# Patient Record
Sex: MALE | Race: WHITE | NOT HISPANIC OR LATINO | URBAN - METROPOLITAN AREA
[De-identification: names, ages, dates, MRNs, and addresses within clinical notes are randomized per-mention and may not be internally consistent; named-entity substitution may affect disease eponyms.]

---

## 2020-03-21 ENCOUNTER — EMERGENCY (EMERGENCY)
Facility: HOSPITAL | Age: 55
LOS: 0 days | Discharge: HOME | End: 2020-03-21
Attending: EMERGENCY MEDICINE
Payer: COMMERCIAL

## 2020-03-21 VITALS
HEIGHT: 69 IN | SYSTOLIC BLOOD PRESSURE: 127 MMHG | TEMPERATURE: 99 F | RESPIRATION RATE: 16 BRPM | HEART RATE: 58 BPM | OXYGEN SATURATION: 100 % | WEIGHT: 162.04 LBS | DIASTOLIC BLOOD PRESSURE: 84 MMHG

## 2020-03-21 DIAGNOSIS — B34.9 VIRAL INFECTION, UNSPECIFIED: ICD-10-CM

## 2020-03-21 DIAGNOSIS — R09.81 NASAL CONGESTION: ICD-10-CM

## 2020-03-21 DIAGNOSIS — Z79.51 LONG TERM (CURRENT) USE OF INHALED STEROIDS: ICD-10-CM

## 2020-03-21 DIAGNOSIS — R05 COUGH: ICD-10-CM

## 2020-03-21 DIAGNOSIS — R50.9 FEVER, UNSPECIFIED: ICD-10-CM

## 2020-03-21 PROCEDURE — 99283 EMERGENCY DEPT VISIT LOW MDM: CPT

## 2020-03-21 PROCEDURE — 99283 EMERGENCY DEPT VISIT LOW MDM: CPT | Mod: 25

## 2020-03-21 PROCEDURE — 71045 X-RAY EXAM CHEST 1 VIEW: CPT

## 2020-03-21 PROCEDURE — 71045 X-RAY EXAM CHEST 1 VIEW: CPT | Mod: 26

## 2020-03-21 RX ORDER — ALBUTEROL 90 UG/1
2 AEROSOL, METERED ORAL
Qty: 1 | Refills: 0
Start: 2020-03-21

## 2020-03-21 NOTE — ED ADULT NURSE NOTE - CAS EDN DISCHARGE ASSESSMENT
no acute respiratory distress, ambulating with steady gait/Alert and oriented to person, place and time

## 2020-03-21 NOTE — ED PROVIDER NOTE - PATIENT PORTAL LINK FT
You can access the FollowMyHealth Patient Portal offered by [facility] by registering at the following website: [email or web address]. By joining [facility]’s FollowMyHealth portal, you will also be able to view your health information using other applications (apps) compatible with our system.

## 2020-03-21 NOTE — ED PROVIDER NOTE - TEMPLATE
Communicable/Infectious ID following Dr Carrasco  Continue on IV Dapto 500mg daily. will switch to Zyvoxx on Monday til 3/22  Silvadene to RLE wound  Wound care consult

## 2020-03-21 NOTE — ED ADULT NURSE NOTE - NSFALLRSKASSESSTYPE_ED_ALL_ED
Review of Systems Health Update:     GENERAL / CONSTITUTIONAL:  [x]  YES    []  NO   Excessive fatigue.  []  YES    [x]  NO   Unexplained weight loss or gain.  []  YES    [x]  NO   Excessive sweating or night sweats.    EYES:  [x]  YES    []  NO   Blurry or double vision.  []  YES    [x]  NO   Eye pain.   []  YES    [x]  NO   Other visual disturbance.    EARS, NOSE, MOUTH AND THROAT:  [x]  YES    [x]  NO   Loss of hearing or prolonged roaring/ringing in ears.   []  YES    [x]  NO   Nasal obstruction or discharge.  []  YES    [x]  NO   Hoarseness or voice changes.  []  YES    [x]  NO   Difficult or painful swallowing.    CARDIOVASCULAR:  [x]  YES    []  NO   Chest pain/discomfort at rest or with exercise.  []  YES    [x]  NO   Heart murmur, palpitations, or irregular heart beat.  [x]  YES    []  NO   History of heart attack or other heart trouble.  [x]  YES    []  NO   Leg cramps with walking.  [x]  YES    []  NO   Ankle swelling.   [x]  YES    []  NO   Shortness of breath.  [x]  YES    []  NO   History of high blood pressure.    LUNGS:   []  YES    [x]  NO   Coughing up blood  []  YES    [x]  NO   Chronic cough.  []  YES    [x]  NO   Abnormal chest x-ray.  []  YES    [x]  NO   Wheezing.  []  YES    [x]  NO   History of positive TB skin test.     IMMUNE SYSTEM/ALLERGIES:  []  YES    [x]  NO   Frequent infections.   [x]  YES    []  NO   History of asthma/allergies.  []  YES    [x]  NO   Frequent nasal congestion.   [x]  YES    []  NO   Itchy or watery eyes.   []  YES    [x]  NO   History of blood transfusion.     INTESTINAL:  [x]  YES    []  NO   Poor appetite.  [x]  YES    []  NO   Frequent indigestion or heartburn.  [x]  YES    []  NO   Nausea, vomiting, diarrhea, or constipation.  []  YES    [x]  NO   Vomiting blood.  []  YES    [x]  NO   Rectal bleeding or black tarry stools.  []  YES    [x]  NO   Diagnosis of hepatitis.    ENDOCRINE:  []  YES    [x]  NO   Heat or cold intolerance.  [x]  YES    []  NO   Excessive  thirst or urination.  [x]  YES    []  NO   History of diabetes or thyroid disease.     HEMATOLOGIC:   []  YES    [x]  NO   Swollen glands or lymph nodes.  []  YES    [x]  NO   History of anemia.   []  YES    [x]  NO   Bruise easily.   []  YES    [x]  NO   Bleeding tendencies.  []  YES    [x]  NO   History of blood clots.    MUSCULOSKELETAL:  [x]  YES    []  NO   Swollen, stiff, or painful joints.   [x]  YES    []  NO   Neck pain.   [x]  YES    []  NO   Back pain.   []  YES    [x]  NO   History of gout.     SKIN:  []  YES    [x]  NO   Recurrent skin rash.   []  YES    [x]  NO   Mole changes in size or color.  []  YES    [x]  NO   Abnormal hair growth or loss.    NEUROLOGIC:   [x]  YES    []  NO   Frequent or severe headaches.  []  YES    [x]  NO   Loss of balance.  []  YES    [x]  NO   Unexplained dizziness.  []  YES    [x]  NO   Loss of consciousness.   []  YES    [x]  NO   History of head injury.  []  YES    [x]  NO   Weakness or recurrent numbness or tingling in hands or feet.  [x]  YES    []  NO   Twitching or tremors.   [x]  YES    []  NO   Difficulty with speech.     UROLOGIC:   []  YES    [x]  NO   Recurrent bladder or kidney infections.   []  YES    [x]  NO   Kidney stones.   []  YES    [x]  NO   Chronic bladder pain, incontinence, difficulty urinating, or urinary frequency.       Initial (On Arrival)

## 2020-03-21 NOTE — ED ADULT NURSE NOTE - CHIEF COMPLAINT QUOTE
pt BIBA for fever and cough since Tuesday, Tmax 102. pt was COVID tested at [facility] on Wednesday, pending results. denies chest pain, no SOB noted in triage

## 2020-03-21 NOTE — ED ADULT NURSE NOTE - OBJECTIVE STATEMENT
c/o difficulty breathing for past 7 days, was seen in urgent care on 3 days ago and swab for covid 19 HX: denies

## 2020-03-21 NOTE — ED ADULT TRIAGE NOTE - CHIEF COMPLAINT QUOTE
pt BIBA for fever and cough since Tuesday, Tmax 102. pt was COVID tested at [facility] on Wednesday, pending results. denies chest pain, no SOB noted in triage

## 2020-03-21 NOTE — ED PROVIDER NOTE - CLINICAL SUMMARY MEDICAL DECISION MAKING FREE TEXT BOX
pt w/ viral syndrome, 100% on room air. plan CXR. Pt had COVID testing, results pending. discussed w/ pt about decongestion for the nose, oral medications, nasal sprays. Pt says he does not like to take medications in the nose

## 2020-03-21 NOTE — ED PROVIDER NOTE - OBJECTIVE STATEMENT
54 YOM w/ no pertinent PMHx presents to the ED BIBA c/o worsening fever and cough onset 4 days ago. +SOB, +sore throat, +congestion. Tmax of 102 F. Denies CP. Pt was tested for COVID at [facility] on 3/18/2020, pending results. No recent travel, no sick contacts. Pt is a non-smoker. 54 YOM w/ no pertinent PMHx presents to the ED BIBA c/o worsening fever and cough onset 4 days ago. +SOB, +sore throat, +congestion. Tmax of 102 F. Denies CP. Pt was tested for COVID at [facility] on 3/18/2020, pending results. Return from [facility] 3 weeks ago, no sick contacts. Pt is a non-smoker.

## 2020-03-21 NOTE — ED PROVIDER NOTE - NSFOLLOWUPINSTRUCTIONS_ED_ALL_ED_FT
Please call and follow up with your doctor in 1-3 days.    Viral Respiratory Infection  A viral respiratory infection is an illness that affects parts of the body used for breathing, like the lungs, nose, and throat. It is caused by a germ called a virus.    Some examples of this kind of infection are:    A cold.  The flu (influenza).  A respiratory syncytial virus (RSV) infection.    How do I know if I have this infection?  Most of the time this infection causes:    A stuffy or runny nose.  Yellow or green fluid in the nose.  A cough.  Sneezing.  Tiredness (fatigue).  Achy muscles.  A sore throat.  Sweating or chills.  A fever.  A headache.    How is this infection treated?  If the flu is diagnosed early, it may be treated with an antiviral medicine. This medicine shortens the length of time a person has symptoms. Symptoms may be treated with over-the-counter and prescription medicines, such as:    Expectorants. These make it easier to cough up mucus.  Decongestant nasal sprays.    Doctors do not prescribe antibiotic medicines for viral infections. They do not work with this kind of infection.    How do I know if I should stay home?  To keep others from getting sick, stay home if you have:    A fever.  A lasting cough.  A sore throat.  A runny nose.  Sneezing.  Muscles aches.  Headaches.  Tiredness.  Weakness.  Chills.  Sweating.  An upset stomach (nausea).    Follow these instructions at home:  Rest as much as possible.  Take over-the-counter and prescription medicines only as told by your doctor.  Drink enough fluid to keep your pee (urine) clear or pale yellow.  Gargle with salt water. Do this 3–4 times per day or as needed. To make a salt–water mixture, dissolve ½–1 tsp of salt in 1 cup of warm water. Make sure the salt dissolves all the way.  Use nose drops made from salt water. This helps with stuffiness (congestion). It also helps soften the skin around your nose.  Do not drink alcohol.  Do not use tobacco products, including cigarettes, chewing tobacco, and e-cigarettes. If you need help quitting, ask your doctor.  Get help if:  Your symptoms last for 10 days or longer.  Your symptoms get worse over time.  You have a fever.  You have very bad pain in your face or forehead.  Parts of your jaw or neck become very swollen.  Get help right away if:  You feel pain or pressure in your chest.  You have shortness of breath.  You faint or feel like you will faint.  You keep throwing up (vomiting).  You feel confused.  This information is not intended to replace advice given to you by your health care provider. Make sure you discuss any questions you have with your health care provider.    Postnasal Drip  Postnasal drip is the feeling of mucus going down the back of your throat. Mucus is a slimy substance that moistens and cleans your nose and throat, as well as the air pockets in face bones near your forehead and cheeks (sinuses). Small amounts of mucus pass from your nose and sinuses down the back of your throat all the time. This is normal. When you produce too much mucus or the mucus gets too thick, you can feel it.  Some common causes of postnasal drip include:  Having more mucus because of:  A cold or the flu.Allergies.Cold air.Certain medicines.Having more mucus that is thicker because of:  A sinus or nasal infection.Dry air.A food allergy.Follow these instructions at home:  Relieving discomfort        Gargle with a salt-water mixture 3–4 times a day or as needed. To make a salt-water mixture, completely dissolve ½–1 tsp of salt in 1 cup of warm water.If the air in your home is dry, use a humidifier to add moisture to the air.Use a saline spray or container (neti pot) to flush out the nose (nasal irrigation). These methods can help clear away mucus and keep the nasal passages moist.General instructions     Take over-the-counter and prescription medicines only as told by your health care provider.Follow instructions from your health care provider about eating or drinking restrictions. You may need to avoid caffeine.Avoid things that you know you are allergic to (allergens), like dust, mold, pollen, pets, or certain foods.Drink enough fluid to keep your urine pale yellow.Keep all follow-up visits as told by your health care provider. This is important.Contact a health care provider if:  You have a fever.You have a sore throat.You have difficulty swallowing.You have headache.You have sinus pain.You have a cough that does not go away.The mucus from your nose becomes thick and is green or yellow in color.You have cold or flu symptoms that last more than 10 days.Summary  Postnasal drip is the feeling of mucus going down the back of your throat.If your health care provider approves, use nasal irrigation or a nasal spray 2?4 times a day.Avoid things that you know you are allergic to (allergens), like dust, mold, pollen, pets, or certain foods.This information is not intended to replace advice given to you by your health care provider. Make sure you discuss any questions you have with your health care provider.

## 2020-03-24 ENCOUNTER — INPATIENT (INPATIENT)
Facility: HOSPITAL | Age: 55
LOS: 3 days | Discharge: HOME | DRG: 871 | End: 2020-03-28
Attending: HOSPITALIST | Admitting: HOSPITALIST
Payer: COMMERCIAL

## 2020-03-24 VITALS
TEMPERATURE: 100 F | HEART RATE: 85 BPM | OXYGEN SATURATION: 94 % | DIASTOLIC BLOOD PRESSURE: 88 MMHG | HEIGHT: 69 IN | WEIGHT: 160.06 LBS | RESPIRATION RATE: 22 BRPM | SYSTOLIC BLOOD PRESSURE: 125 MMHG

## 2020-03-24 PROCEDURE — 93010 ELECTROCARDIOGRAM REPORT: CPT

## 2020-03-24 RX ORDER — AZITHROMYCIN 500 MG/1
500 TABLET, FILM COATED ORAL ONCE
Refills: 0 | Status: COMPLETED | OUTPATIENT
Start: 2020-03-24 | End: 2020-03-24

## 2020-03-24 RX ORDER — CEFTRIAXONE 500 MG/1
1000 INJECTION, POWDER, FOR SOLUTION INTRAMUSCULAR; INTRAVENOUS ONCE
Refills: 0 | Status: COMPLETED | OUTPATIENT
Start: 2020-03-24 | End: 2020-03-24

## 2020-03-24 NOTE — ED PROVIDER NOTE - CLINICAL SUMMARY MEDICAL DECISION MAKING FREE TEXT BOX
53 y/o M with confirmed Covid 19 infection p/w hypoxia and increased SOB.  Plan: CBC, respiratory monitoring/support, Azithromycin/Rocephin, admission

## 2020-03-24 NOTE — ED PROVIDER NOTE - OBJECTIVE STATEMENT
55 y/o M with no prior medical history p/w sob and cough for the past few days.  He notes fatigue f/c/r and decreased appetite for the past week.  He was tested for Covid 19 at urgent care which came back + in the last 24 hours.  Pt 55 y/o M with no prior medical history p/w sob and cough for the past few days.  He notes fatigue f/c/r and decreased appetite for the past week.  He was tested for Covid 19 at urgent care which came back + in the last 24 hours.  Pt hypoxic on RA at triage so put on NC at 2 LPM

## 2020-03-24 NOTE — ED ADULT TRIAGE NOTE - NS ED TRIAGE AVPU SCALE
Alert-The patient is alert, awake and responds to voice. The patient is oriented to time, place, and person. The triage nurse is able to obtain subjective information.
17-Oct-2018 02:12

## 2020-03-25 DIAGNOSIS — J12.9 VIRAL PNEUMONIA, UNSPECIFIED: ICD-10-CM

## 2020-03-25 LAB
ALBUMIN SERPL ELPH-MCNC: 2.7 G/DL — LOW (ref 3.3–5)
ALBUMIN SERPL ELPH-MCNC: 2.9 G/DL — LOW (ref 3.3–5)
ALP SERPL-CCNC: 56 U/L — SIGNIFICANT CHANGE UP (ref 40–120)
ALP SERPL-CCNC: 62 U/L — SIGNIFICANT CHANGE UP (ref 40–120)
ALT FLD-CCNC: 19 U/L — SIGNIFICANT CHANGE UP (ref 12–78)
ALT FLD-CCNC: 21 U/L — SIGNIFICANT CHANGE UP (ref 12–78)
ANION GAP SERPL CALC-SCNC: 5 MMOL/L — SIGNIFICANT CHANGE UP (ref 5–17)
ANION GAP SERPL CALC-SCNC: 6 MMOL/L — SIGNIFICANT CHANGE UP (ref 5–17)
APTT BLD: 33 SEC — SIGNIFICANT CHANGE UP (ref 27.5–36.3)
AST SERPL-CCNC: 20 U/L — SIGNIFICANT CHANGE UP (ref 15–37)
AST SERPL-CCNC: 24 U/L — SIGNIFICANT CHANGE UP (ref 15–37)
BASOPHILS # BLD AUTO: 0.01 K/UL — SIGNIFICANT CHANGE UP (ref 0–0.2)
BASOPHILS # BLD AUTO: 0.02 K/UL — SIGNIFICANT CHANGE UP (ref 0–0.2)
BASOPHILS NFR BLD AUTO: 0.1 % — SIGNIFICANT CHANGE UP (ref 0–2)
BASOPHILS NFR BLD AUTO: 0.3 % — SIGNIFICANT CHANGE UP (ref 0–2)
BILIRUB SERPL-MCNC: 0.6 MG/DL — SIGNIFICANT CHANGE UP (ref 0.2–1.2)
BILIRUB SERPL-MCNC: 0.7 MG/DL — SIGNIFICANT CHANGE UP (ref 0.2–1.2)
BUN SERPL-MCNC: 20 MG/DL — SIGNIFICANT CHANGE UP (ref 7–23)
BUN SERPL-MCNC: 21 MG/DL — SIGNIFICANT CHANGE UP (ref 7–23)
CALCIUM SERPL-MCNC: 8.7 MG/DL — SIGNIFICANT CHANGE UP (ref 8.5–10.1)
CALCIUM SERPL-MCNC: 8.8 MG/DL — SIGNIFICANT CHANGE UP (ref 8.5–10.1)
CHLORIDE SERPL-SCNC: 103 MMOL/L — SIGNIFICANT CHANGE UP (ref 96–108)
CHLORIDE SERPL-SCNC: 103 MMOL/L — SIGNIFICANT CHANGE UP (ref 96–108)
CO2 SERPL-SCNC: 26 MMOL/L — SIGNIFICANT CHANGE UP (ref 22–31)
CO2 SERPL-SCNC: 28 MMOL/L — SIGNIFICANT CHANGE UP (ref 22–31)
CREAT SERPL-MCNC: 1.32 MG/DL — HIGH (ref 0.5–1.3)
CREAT SERPL-MCNC: 1.57 MG/DL — HIGH (ref 0.5–1.3)
CRP SERPL-MCNC: 17.18 MG/DL — HIGH (ref 0–0.4)
EOSINOPHIL # BLD AUTO: 0 K/UL — SIGNIFICANT CHANGE UP (ref 0–0.5)
EOSINOPHIL # BLD AUTO: 0 K/UL — SIGNIFICANT CHANGE UP (ref 0–0.5)
EOSINOPHIL NFR BLD AUTO: 0 % — SIGNIFICANT CHANGE UP (ref 0–6)
EOSINOPHIL NFR BLD AUTO: 0 % — SIGNIFICANT CHANGE UP (ref 0–6)
FERRITIN SERPL-MCNC: 409 NG/ML — HIGH (ref 30–400)
GLUCOSE SERPL-MCNC: 105 MG/DL — HIGH (ref 70–99)
GLUCOSE SERPL-MCNC: 124 MG/DL — HIGH (ref 70–99)
HCT VFR BLD CALC: 40 % — SIGNIFICANT CHANGE UP (ref 39–50)
HCT VFR BLD CALC: 43.1 % — SIGNIFICANT CHANGE UP (ref 39–50)
HCV AB S/CO SERPL IA: 0.21 S/CO — SIGNIFICANT CHANGE UP (ref 0–0.99)
HCV AB SERPL-IMP: SIGNIFICANT CHANGE UP
HGB BLD-MCNC: 13.7 G/DL — SIGNIFICANT CHANGE UP (ref 13–17)
HGB BLD-MCNC: 14.6 G/DL — SIGNIFICANT CHANGE UP (ref 13–17)
IMM GRANULOCYTES NFR BLD AUTO: 0.4 % — SIGNIFICANT CHANGE UP (ref 0–1.5)
IMM GRANULOCYTES NFR BLD AUTO: 0.4 % — SIGNIFICANT CHANGE UP (ref 0–1.5)
INR BLD: 1.25 RATIO — HIGH (ref 0.88–1.16)
LACTATE SERPL-SCNC: 1.2 MMOL/L — SIGNIFICANT CHANGE UP (ref 0.7–2)
LYMPHOCYTES # BLD AUTO: 0.54 K/UL — LOW (ref 1–3.3)
LYMPHOCYTES # BLD AUTO: 0.7 K/UL — LOW (ref 1–3.3)
LYMPHOCYTES # BLD AUTO: 10.1 % — LOW (ref 13–44)
LYMPHOCYTES # BLD AUTO: 7.1 % — LOW (ref 13–44)
MAGNESIUM SERPL-MCNC: 2.1 MG/DL — SIGNIFICANT CHANGE UP (ref 1.6–2.6)
MCHC RBC-ENTMCNC: 30.9 PG — SIGNIFICANT CHANGE UP (ref 27–34)
MCHC RBC-ENTMCNC: 31.6 PG — SIGNIFICANT CHANGE UP (ref 27–34)
MCHC RBC-ENTMCNC: 33.9 GM/DL — SIGNIFICANT CHANGE UP (ref 32–36)
MCHC RBC-ENTMCNC: 34.3 GM/DL — SIGNIFICANT CHANGE UP (ref 32–36)
MCV RBC AUTO: 91.3 FL — SIGNIFICANT CHANGE UP (ref 80–100)
MCV RBC AUTO: 92.2 FL — SIGNIFICANT CHANGE UP (ref 80–100)
MONOCYTES # BLD AUTO: 0.38 K/UL — SIGNIFICANT CHANGE UP (ref 0–0.9)
MONOCYTES # BLD AUTO: 0.42 K/UL — SIGNIFICANT CHANGE UP (ref 0–0.9)
MONOCYTES NFR BLD AUTO: 5 % — SIGNIFICANT CHANGE UP (ref 2–14)
MONOCYTES NFR BLD AUTO: 6.1 % — SIGNIFICANT CHANGE UP (ref 2–14)
NEUTROPHILS # BLD AUTO: 5.78 K/UL — SIGNIFICANT CHANGE UP (ref 1.8–7.4)
NEUTROPHILS # BLD AUTO: 6.66 K/UL — SIGNIFICANT CHANGE UP (ref 1.8–7.4)
NEUTROPHILS NFR BLD AUTO: 83.3 % — HIGH (ref 43–77)
NEUTROPHILS NFR BLD AUTO: 87.2 % — HIGH (ref 43–77)
PHOSPHATE SERPL-MCNC: 3.5 MG/DL — SIGNIFICANT CHANGE UP (ref 2.5–4.5)
PLATELET # BLD AUTO: 216 K/UL — SIGNIFICANT CHANGE UP (ref 150–400)
PLATELET # BLD AUTO: 223 K/UL — SIGNIFICANT CHANGE UP (ref 150–400)
POTASSIUM SERPL-MCNC: 3.7 MMOL/L — SIGNIFICANT CHANGE UP (ref 3.5–5.3)
POTASSIUM SERPL-MCNC: 4.1 MMOL/L — SIGNIFICANT CHANGE UP (ref 3.5–5.3)
POTASSIUM SERPL-SCNC: 3.7 MMOL/L — SIGNIFICANT CHANGE UP (ref 3.5–5.3)
POTASSIUM SERPL-SCNC: 4.1 MMOL/L — SIGNIFICANT CHANGE UP (ref 3.5–5.3)
PROT SERPL-MCNC: 7.1 GM/DL — SIGNIFICANT CHANGE UP (ref 6–8.3)
PROT SERPL-MCNC: 7.4 GM/DL — SIGNIFICANT CHANGE UP (ref 6–8.3)
PROTHROM AB SERPL-ACNC: 14 SEC — HIGH (ref 10–12.9)
RBC # BLD: 4.34 M/UL — SIGNIFICANT CHANGE UP (ref 4.2–5.8)
RBC # BLD: 4.72 M/UL — SIGNIFICANT CHANGE UP (ref 4.2–5.8)
RBC # FLD: 12.1 % — SIGNIFICANT CHANGE UP (ref 10.3–14.5)
RBC # FLD: 12.3 % — SIGNIFICANT CHANGE UP (ref 10.3–14.5)
SODIUM SERPL-SCNC: 135 MMOL/L — SIGNIFICANT CHANGE UP (ref 135–145)
SODIUM SERPL-SCNC: 136 MMOL/L — SIGNIFICANT CHANGE UP (ref 135–145)
WBC # BLD: 6.94 K/UL — SIGNIFICANT CHANGE UP (ref 3.8–10.5)
WBC # BLD: 7.63 K/UL — SIGNIFICANT CHANGE UP (ref 3.8–10.5)
WBC # FLD AUTO: 6.94 K/UL — SIGNIFICANT CHANGE UP (ref 3.8–10.5)
WBC # FLD AUTO: 7.63 K/UL — SIGNIFICANT CHANGE UP (ref 3.8–10.5)

## 2020-03-25 PROCEDURE — 86803 HEPATITIS C AB TEST: CPT

## 2020-03-25 PROCEDURE — 80053 COMPREHEN METABOLIC PANEL: CPT

## 2020-03-25 PROCEDURE — 84100 ASSAY OF PHOSPHORUS: CPT

## 2020-03-25 PROCEDURE — 12345: CPT | Mod: NC,GC

## 2020-03-25 PROCEDURE — 85027 COMPLETE CBC AUTOMATED: CPT

## 2020-03-25 PROCEDURE — 85025 COMPLETE CBC W/AUTO DIFF WBC: CPT

## 2020-03-25 PROCEDURE — 93010 ELECTROCARDIOGRAM REPORT: CPT

## 2020-03-25 PROCEDURE — 71045 X-RAY EXAM CHEST 1 VIEW: CPT | Mod: 26

## 2020-03-25 PROCEDURE — 36415 COLL VENOUS BLD VENIPUNCTURE: CPT

## 2020-03-25 PROCEDURE — 93005 ELECTROCARDIOGRAM TRACING: CPT

## 2020-03-25 PROCEDURE — 80048 BASIC METABOLIC PNL TOTAL CA: CPT

## 2020-03-25 PROCEDURE — 99223 1ST HOSP IP/OBS HIGH 75: CPT

## 2020-03-25 PROCEDURE — 83735 ASSAY OF MAGNESIUM: CPT

## 2020-03-25 RX ORDER — ACETAMINOPHEN 500 MG
650 TABLET ORAL EVERY 4 HOURS
Refills: 0 | Status: DISCONTINUED | OUTPATIENT
Start: 2020-03-25 | End: 2020-03-28

## 2020-03-25 RX ORDER — ALBUTEROL 90 UG/1
4 AEROSOL, METERED ORAL ONCE
Refills: 0 | Status: COMPLETED | OUTPATIENT
Start: 2020-03-25 | End: 2020-03-25

## 2020-03-25 RX ORDER — CEFTRIAXONE 500 MG/1
1000 INJECTION, POWDER, FOR SOLUTION INTRAMUSCULAR; INTRAVENOUS EVERY 24 HOURS
Refills: 0 | Status: DISCONTINUED | OUTPATIENT
Start: 2020-03-25 | End: 2020-03-28

## 2020-03-25 RX ORDER — ONDANSETRON 8 MG/1
4 TABLET, FILM COATED ORAL ONCE
Refills: 0 | Status: COMPLETED | OUTPATIENT
Start: 2020-03-25 | End: 2020-03-25

## 2020-03-25 RX ORDER — AZITHROMYCIN 500 MG/1
500 TABLET, FILM COATED ORAL DAILY
Refills: 0 | Status: DISCONTINUED | OUTPATIENT
Start: 2020-03-25 | End: 2020-03-25

## 2020-03-25 RX ORDER — HYDROXYCHLOROQUINE SULFATE 200 MG
200 TABLET ORAL EVERY 12 HOURS
Refills: 0 | Status: DISCONTINUED | OUTPATIENT
Start: 2020-03-26 | End: 2020-03-28

## 2020-03-25 RX ORDER — AZITHROMYCIN 500 MG/1
250 TABLET, FILM COATED ORAL DAILY
Refills: 0 | Status: COMPLETED | OUTPATIENT
Start: 2020-03-25 | End: 2020-03-28

## 2020-03-25 RX ORDER — HYDROXYCHLOROQUINE SULFATE 200 MG
TABLET ORAL
Refills: 0 | Status: DISCONTINUED | OUTPATIENT
Start: 2020-03-25 | End: 2020-03-28

## 2020-03-25 RX ORDER — FUROSEMIDE 40 MG
20 TABLET ORAL ONCE
Refills: 0 | Status: DISCONTINUED | OUTPATIENT
Start: 2020-03-25 | End: 2020-03-25

## 2020-03-25 RX ORDER — HYDROXYCHLOROQUINE SULFATE 200 MG
400 TABLET ORAL EVERY 12 HOURS
Refills: 0 | Status: COMPLETED | OUTPATIENT
Start: 2020-03-25 | End: 2020-03-25

## 2020-03-25 RX ADMIN — Medication 650 MILLIGRAM(S): at 04:24

## 2020-03-25 RX ADMIN — ONDANSETRON 4 MILLIGRAM(S): 8 TABLET, FILM COATED ORAL at 00:54

## 2020-03-25 RX ADMIN — CEFTRIAXONE 1000 MILLIGRAM(S): 500 INJECTION, POWDER, FOR SOLUTION INTRAMUSCULAR; INTRAVENOUS at 14:00

## 2020-03-25 RX ADMIN — Medication 650 MILLIGRAM(S): at 14:00

## 2020-03-25 RX ADMIN — AZITHROMYCIN 255 MILLIGRAM(S): 500 TABLET, FILM COATED ORAL at 00:09

## 2020-03-25 RX ADMIN — AZITHROMYCIN 250 MILLIGRAM(S): 500 TABLET, FILM COATED ORAL at 14:03

## 2020-03-25 RX ADMIN — Medication 400 MILLIGRAM(S): at 22:28

## 2020-03-25 RX ADMIN — CEFTRIAXONE 1000 MILLIGRAM(S): 500 INJECTION, POWDER, FOR SOLUTION INTRAMUSCULAR; INTRAVENOUS at 00:09

## 2020-03-25 RX ADMIN — ALBUTEROL 4 PUFF(S): 90 AEROSOL, METERED ORAL at 00:58

## 2020-03-25 RX ADMIN — AZITHROMYCIN 500 MILLIGRAM(S): 500 TABLET, FILM COATED ORAL at 02:00

## 2020-03-25 RX ADMIN — Medication 650 MILLIGRAM(S): at 06:13

## 2020-03-25 RX ADMIN — Medication 400 MILLIGRAM(S): at 10:08

## 2020-03-25 NOTE — PROGRESS NOTE ADULT - ASSESSMENT
54M with no significant PMH presented for worsening dyspnea in the context of COVID-19; admitted for hypoxia    #Acute hypoxic respiratory failure a/w COVID-19  -Saturating well on nasal cannula 5L  -Plaquenil and azithromycin (day 1/5)  -Monitor pulse ox  -F/u ID consult  -Continue ceftriaxone for possible superimposed bacterial infection  -Hold lasix in light of MOE    #MOE vs CKD  -B/l Cr unknownh  -Likely MOE in the setting of dehydration  -Will avoid IV fluids in the setting of COVID-19  -Trend Cr    #DVT ppx  -SCD    D/w Dr. Bates

## 2020-03-25 NOTE — CONSULT NOTE ADULT - ASSESSMENT
55 y/o M with no significant PMH, p/w cough that started on 3/24 and fever. Patient states he went to urgent care center and was told he was positive for COVID. He came to ED because he was having progressively worsening SOB. In ED, patient reported to be hypoxic and was placed on Nasal canula. At bedside during my examination, patient was 92-95% on nasal canula and had no respiratory distress at all. 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 Denies nausea, vomiting, abdominal pain. Here febrile, nl wbc ct, xray clear was given rocephin/azithro/plaquenil.     1. fever. cough. hypoxia. viral syndrome- COVID-19  - agree with rocephin 1gm daily  - agree with azithromycin 500mg x 1, 250mg daily 4 days  - abx for superimposed bacterial coverage  - on plaquenil off label use 5 day course  - f/u cultures  - monitor temps  - tolerating abx well so far; no side effects noted  - reason for abx use and side effects reviewed with patient  - supportive care  - fu cbc  2. other issues - care per medicine

## 2020-03-25 NOTE — H&P ADULT - HISTORY OF PRESENT ILLNESS
53 y/o M with no significant PMH, p/w cough that started on Tuesday and fever. Patient states he went to urgent care center and was told he was positive for COVID. He came to ED because he was having progressively worsening SOB. In ED, patient reported to be hypoxic and was placed on Nasal canula. At bedside during my examination, patient was 92-95% on nasal canula and had no respiratory distress at all. 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 Denies nausea, vomiting, abdominal pain    Denies any past medical history and denies taking any medications at home       PSH: Denies     Social Hx: Denies tobacco, etoh - one drink every few months, drugs - denies     Family Hx: Father - prostate cancer

## 2020-03-25 NOTE — H&P ADULT - ASSESSMENT
53 y/o M with no significant PMH, p/w cough    *Sepsis 2/2 COVID 19 infection  -Plaquenil  - explained to patient risks vs benefits vs alternatives and off-label use of this medication. Patient agreeable  -ID consult  -Started on Ceftriaxone/ Azithromycin in ED, will continue for possible superimposed bacterial infection   -Presently no visible respiratory distress  -F/u final CXR report     *Suspected MOE  -No baseline creatinine available  -Encourage oral hydration and trend creatinine in AM to check for improvement  -UA    *DVT ppx  -SCDs 55 y/o M with no significant PMH, p/w cough    *Sepsis 2/2 COVID 19 infection  -Plaquenil  - explained to patient risks vs benefits vs alternatives and off-label use of this medication. Patient agreeable  -ID consult  -Started on Ceftriaxone/ Azithromycin in ED, will continue for possible superimposed bacterial infection   -Presently no visible respiratory distress  -F/u final CXR report   -Isolation precautions   -Pulse ox monitoring    *Suspected MOE  -No baseline creatinine available  -Encourage oral hydration and trend creatinine in AM to check for improvement  -UA    *DVT ppx  -SCDs

## 2020-03-25 NOTE — PROGRESS NOTE ADULT - SUBJECTIVE AND OBJECTIVE BOX
54M without pertinent PMH presented for cough, fever, and progressively worsening dyspnea; told at an urgent care that he was positive for COVID-19. Patient was hypoxic in the ED and required nasal cannula. 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    3/25/20: Patient is maintaining SpO2 % on 5L nasal cannula. Denies dyspnea and chest pain; states cough has improved. Reviewed the risks and benefits of treatment with plaquenil and azithromycin and patient agreed to treatment.    Vital Signs Last 24 Hrs  T(C): 36.7 (25 Mar 2020 11:06), Max: 38.3 (25 Mar 2020 04:30)  T(F): 98 (25 Mar 2020 11:06), Max: 101 (25 Mar 2020 04:30)  HR: 71 (25 Mar 2020 11:06) (69 - 100)  BP: 114/83 (25 Mar 2020 11:06) (99/65 - 125/88)  BP(mean): --  RR: 18 (25 Mar 2020 11:06) (18 - 22)  SpO2: 100% (25 Mar 2020 11:06) (93% - 100%)    Physical Exam  Gen: WD, WN, NAD  HENT: EOMI, moist mucous membranes  Neck: Trachea midline  CV: RRR, no r/g/m  Pulm: CTAB, no crackles, wheezes, or rhonchi  Abd: Soft, NT, ND  Ext: No LE edema  Neuro: Grossly intact  Psych: Normal affective range      LABS:  cret                        13.7   6.94  )-----------( 216      ( 25 Mar 2020 07:05 )             40.0     03-25    135  |  103  |  20  ----------------------------<  105<H>  4.1   |  26  |  1.32<H>    Ca    8.8      25 Mar 2020 07:05  Phos  3.5     03-25  Mg     2.1     03-25    TPro  7.1  /  Alb  2.7<L>  /  TBili  0.6  /  DBili  x   /  AST  20  /  ALT  19  /  AlkPhos  56  03-25    PT/INR - ( 24 Mar 2020 23:32 )   PT: 14.0 sec;   INR: 1.25 ratio         PTT - ( 24 Mar 2020 23:32 )  PTT:33.0 sec      < from: Xray Chest 1 View-PORTABLE IMMEDIATE (03.25.20 @ 01:16) >  IMPRESSION:  The lungs are clear.  No pleural abnormality is seen.    < end of copied text >

## 2020-03-25 NOTE — CONSULT NOTE ADULT - SUBJECTIVE AND OBJECTIVE BOX
Patient is a 54y old  Male who presents with a chief complaint of cough (25 Mar 2020 11:48)    HPI:  53 y/o M with no significant PMH, p/w cough that started on 3/24 and fever. Patient states he went to urgent care center and was told he was positive for COVID. He came to ED because he was having progressively worsening SOB. In ED, patient reported to be hypoxic and was placed on Nasal canula. At bedside during my examination, patient was 92-95% on nasal canula and had no respiratory distress at all. 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 Denies nausea, vomiting, abdominal pain. Here febrile, nl wbc ct, xray clear was given rocephin/azithro/plaquenil.     PMH: as above  PSH: as above  Meds: per reconciliation sheet, noted below  MEDICATIONS  (STANDING):  azithromycin   Tablet 250 milliGRAM(s) Oral daily  cefTRIAXone Injectable. 1000 milliGRAM(s) IV Push every 24 hours  hydroxychloroquine 400 milliGRAM(s) Oral every 12 hours  hydroxychloroquine   Oral     Allergies    No Known Allergies    Intolerances      Social: no smoking, no alcohol, no illegal drugs; no recent travel, no exposure to TB  FAMILY HISTORY:     no history of premature cardiovascular disease in first degree relatives    ROS: no HA, no dizziness, no sore throat, no blurry vision, no CP, no palpitations, no abdominal pain, no diarrhea, no N/V, no dysuria, no leg pain, no claudication, no rash, no joint aches, no rectal pain or bleeding, no night sweats  All other systems reviewed and are negative    Vital Signs Last 24 Hrs  T(C): 37.7 (25 Mar 2020 15:40), Max: 38.6 (25 Mar 2020 15:10)  T(F): 99.9 (25 Mar 2020 15:40), Max: 101.4 (25 Mar 2020 15:10)  HR: 81 (25 Mar 2020 16:12) (69 - 100)  BP: 95/72 (25 Mar 2020 16:12) (95/72 - 125/88)  BP(mean): --  RR: 18 (25 Mar 2020 16:12) (18 - 22)  SpO2: 96% (25 Mar 2020 15:40) (93% - 100%)  Daily Height in cm: 175.26 (24 Mar 2020 23:08)    Daily     PE:  Constitutional: frail looking  HEENT: NC/AT, EOMI, PERRLA, conjunctivae clear; ears and nose atraumatic; pharynx benign  Neck: supple; thyroid not palpable  Back: no tenderness  Respiratory: decreased breath sounds  Cardiovascular: S1S2 regular, no murmurs  Abdomen: soft, not tender, not distended, positive BS; liver and spleen WNL  Genitourinary: no suprapubic tenderness  Lymphatic: no LN palpable  Musculoskeletal: no muscle tenderness, no joint swelling or tenderness  Extremities: no pedal edema  Neurological/ Psychiatric:  moving all extremities  Skin: no rashes; no palpable lesions    Labs: all available labs reviewed                        13.7   6.94  )-----------( 216      ( 25 Mar 2020 07:05 )             40.0     03-25    135  |  103  |  20  ----------------------------<  105<H>  4.1   |  26  |  1.32<H>    Ca    8.8      25 Mar 2020 07:05  Phos  3.5     03-25  Mg     2.1     03-25    TPro  7.1  /  Alb  2.7<L>  /  TBili  0.6  /  DBili  x   /  AST  20  /  ALT  19  /  AlkPhos  56  03-25     LIVER FUNCTIONS - ( 25 Mar 2020 07:05 )  Alb: 2.7 g/dL / Pro: 7.1 gm/dL / ALK PHOS: 56 U/L / ALT: 19 U/L / AST: 20 U/L / GGT: x                 Radiology: all available radiological tests reviewed  < from: Xray Chest 1 View-PORTABLE IMMEDIATE (03.25.20 @ 01:16) >  EXAM:  XR CHEST PORTABLE IMMED 1V                            PROCEDURE DATE:  03/25/2020          INTERPRETATION:  Exam Date: 3/25/2020 1:16 AM    Chest radiograph (one view)         CLINICAL INFORMATION:  Sepsis    TECHNIQUE:  Single frontal view of the chest was obtained.    COMPARISON: 3/21/2020    FINDINGS/  IMPRESSION:          The lungs are clear.  No pleural abnormality is seen.      Cardiomediastinal silhouette is intact.    < end of copied text >    Advanced directives addressed: full resuscitation

## 2020-03-25 NOTE — PROGRESS NOTE ADULT - ATTENDING COMMENTS
on exam- comfortable   -rs-aeeb, crackles present   -p/a-soft, bs+  -cvs-s1s2 normal     #ct abx, supportive care

## 2020-03-25 NOTE — PHARMACOTHERAPY INTERVENTION NOTE - COMMENTS
COVID-19
COVID-19. Entered end date on 02/28/2020 23:59, 5-day course (500 mg x1, then 250 mg QD x4 days) preferred as part of the hospital-wide stewardship effort, per ID physicians.

## 2020-03-26 LAB
ANION GAP SERPL CALC-SCNC: 4 MMOL/L — LOW (ref 5–17)
BUN SERPL-MCNC: 18 MG/DL — SIGNIFICANT CHANGE UP (ref 7–23)
CALCIUM SERPL-MCNC: 8.7 MG/DL — SIGNIFICANT CHANGE UP (ref 8.5–10.1)
CHLORIDE SERPL-SCNC: 105 MMOL/L — SIGNIFICANT CHANGE UP (ref 96–108)
CO2 SERPL-SCNC: 28 MMOL/L — SIGNIFICANT CHANGE UP (ref 22–31)
CREAT SERPL-MCNC: 1.23 MG/DL — SIGNIFICANT CHANGE UP (ref 0.5–1.3)
GLUCOSE SERPL-MCNC: 100 MG/DL — HIGH (ref 70–99)
HCT VFR BLD CALC: 41.1 % — SIGNIFICANT CHANGE UP (ref 39–50)
HGB BLD-MCNC: 13.9 G/DL — SIGNIFICANT CHANGE UP (ref 13–17)
MCHC RBC-ENTMCNC: 31.2 PG — SIGNIFICANT CHANGE UP (ref 27–34)
MCHC RBC-ENTMCNC: 33.8 GM/DL — SIGNIFICANT CHANGE UP (ref 32–36)
MCV RBC AUTO: 92.4 FL — SIGNIFICANT CHANGE UP (ref 80–100)
PLATELET # BLD AUTO: 233 K/UL — SIGNIFICANT CHANGE UP (ref 150–400)
POTASSIUM SERPL-MCNC: 4.7 MMOL/L — SIGNIFICANT CHANGE UP (ref 3.5–5.3)
POTASSIUM SERPL-SCNC: 4.7 MMOL/L — SIGNIFICANT CHANGE UP (ref 3.5–5.3)
RBC # BLD: 4.45 M/UL — SIGNIFICANT CHANGE UP (ref 4.2–5.8)
RBC # FLD: 12.2 % — SIGNIFICANT CHANGE UP (ref 10.3–14.5)
SODIUM SERPL-SCNC: 137 MMOL/L — SIGNIFICANT CHANGE UP (ref 135–145)
WBC # BLD: 7.67 K/UL — SIGNIFICANT CHANGE UP (ref 3.8–10.5)
WBC # FLD AUTO: 7.67 K/UL — SIGNIFICANT CHANGE UP (ref 3.8–10.5)

## 2020-03-26 PROCEDURE — 93010 ELECTROCARDIOGRAM REPORT: CPT

## 2020-03-26 PROCEDURE — 99233 SBSQ HOSP IP/OBS HIGH 50: CPT | Mod: GC

## 2020-03-26 RX ORDER — ALBUTEROL 90 UG/1
2 AEROSOL, METERED ORAL EVERY 6 HOURS
Refills: 0 | Status: DISCONTINUED | OUTPATIENT
Start: 2020-03-26 | End: 2020-03-28

## 2020-03-26 RX ADMIN — CEFTRIAXONE 1000 MILLIGRAM(S): 500 INJECTION, POWDER, FOR SOLUTION INTRAMUSCULAR; INTRAVENOUS at 13:51

## 2020-03-26 RX ADMIN — AZITHROMYCIN 250 MILLIGRAM(S): 500 TABLET, FILM COATED ORAL at 13:51

## 2020-03-26 RX ADMIN — Medication 200 MILLIGRAM(S): at 17:51

## 2020-03-26 RX ADMIN — Medication 200 MILLIGRAM(S): at 13:50

## 2020-03-26 RX ADMIN — Medication 200 MILLIGRAM(S): at 06:27

## 2020-03-26 NOTE — PROGRESS NOTE ADULT - SUBJECTIVE AND OBJECTIVE BOX
Pt has been seen and examined with FP resident, resident supervised agree with a/p       Patient is a 54y old  Male who presents with a chief complaint of cough (25 Mar 2020 16:40)        PHYSICAL EXAM:  Vital Signs Last 24 Hrs  T(C): 37.3 (26 Mar 2020 11:41), Max: 38.6 (25 Mar 2020 15:10)  T(F): 99.1 (26 Mar 2020 11:41), Max: 101.4 (25 Mar 2020 15:10)  HR: 66 (26 Mar 2020 11:41) (65 - 84)  BP: 97/72 (26 Mar 2020 11:41) (95/72 - 110/65)  BP(mean): --  RR: 18 (26 Mar 2020 11:41) (18 - 18)  SpO2: 99% (26 Mar 2020 11:41) (95% - 99%)  general- comfortable   -rs-aeeb,cta  -cvs-s1s2 normal   -p/a-soft,bs+        A/P    #ct supportive care     #dvt pr

## 2020-03-26 NOTE — PROGRESS NOTE ADULT - SUBJECTIVE AND OBJECTIVE BOX
54M without pertinent PMH presented for cough, fever, and progressively worsening dyspnea; told at an urgent care that he was positive for COVID-19. Patient was hypoxic in the ED and required nasal cannula. 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    3/26/20 Patient is maintaining SpO2 94-95% on 2L nasal cannula. Drops to low 90's on RA. Denies dyspnea but c/o chest tightness when he breaths and; states cough has improved although still a lot. On hydroxychloroquine and azithromycin day 2    ROS  Gen: no fevers, chills, sweats, weight loss,+ fatigue  Visual: no recent changes in vision, no blurriness, no seeing spots  Cardiovascular: no chest pain, no palpitations, no orthopnea, no leg swelling  Respiratory: no shortness of breath, + exertional dyspnea, +cough, no rhinorrhea, no nasal congestion  GI: no nausea, no vomiting, no abdominal pain, + diarrhea, no constipation  Neuro: no headache, no numbness, no weakness, no memory loss  Psych: no depression, no anxiety, no SI    Vital Signs Last 24 Hrs  T(C): 36.8 (26 Mar 2020 17:50), Max: 37.4 (26 Mar 2020 06:02)  T(F): 98.3 (26 Mar 2020 17:50), Max: 99.3 (26 Mar 2020 06:02)  HR: 66 (26 Mar 2020 11:41) (65 - 84)  BP: 97/72 (26 Mar 2020 11:41) (97/72 - 110/65)  BP(mean): --  RR: 18 (26 Mar 2020 11:41) (18 - 18)  SpO2: 99% (26 Mar 2020 11:41) (95% - 99%)    Physical Exam  Gen: WD, WN, NAD  HENT: EOMI, moist mucous membranes  Neck: Trachea midline  CV: RRR, no r/g/m  Pulm: CTAB, no crackles, wheezes, or rhonchi  Abd: Soft, NT, ND  Ext: No LE edema  Neuro: Grossly intact  Psych: Normal affective range      LABS:                       13.9   7.67  )-----------( 233      ( 26 Mar 2020 08:07 )             41.1     03-26    137  |  105  |  18  ----------------------------<  100<H>  4.7   |  28  |  1.23    Ca    8.7      26 Mar 2020 08:07  Phos  3.5     03-25  Mg     2.1     03-25    TPro  7.1  /  Alb  2.7<L>  /  TBili  0.6  /  DBili  x   /  AST  20  /  ALT  19  /  AlkPhos  56  03-25      PT/INR - ( 24 Mar 2020 23:32 )   PT: 14.0 sec;   INR: 1.25 ratio         PTT - ( 24 Mar 2020 23:32 )  PTT:33.0 sec      < from: Xray Chest 1 View-PORTABLE IMMEDIATE (03.25.20 @ 01:16) >  IMPRESSION:  The lungs are clear.  No pleural abnormality is seen.    < end of copied text >

## 2020-03-26 NOTE — PROGRESS NOTE ADULT - SUBJECTIVE AND OBJECTIVE BOX
Date of service: 03-26-20 @ 17:34    pt seen and examined  feels better   temps down  has some pleuritic cp  productive cough    ROS:  denies dizziness, no HA, no abdominal pain, no diarrhea or constipation; no dysuria, no urinary frequency, no legs pain, no rashes    MEDICATIONS  (STANDING):  azithromycin   Tablet 250 milliGRAM(s) Oral daily  cefTRIAXone Injectable. 1000 milliGRAM(s) IV Push every 24 hours  hydroxychloroquine 200 milliGRAM(s) Oral every 12 hours  hydroxychloroquine   Oral     Vital Signs Last 24 Hrs  T(C): 37.3 (26 Mar 2020 11:41), Max: 37.4 (26 Mar 2020 06:02)  T(F): 99.1 (26 Mar 2020 11:41), Max: 99.3 (26 Mar 2020 06:02)  HR: 66 (26 Mar 2020 11:41) (65 - 84)  BP: 97/72 (26 Mar 2020 11:41) (97/72 - 110/65)  BP(mean): --  RR: 18 (26 Mar 2020 11:41) (18 - 18)  SpO2: 99% (26 Mar 2020 11:41) (95% - 99%)    PE:  Constitutional: frail looking  HEENT: NC/AT, EOMI, PERRLA, conjunctivae clear; ears and nose atraumatic; pharynx benign  Neck: supple; thyroid not palpable  Back: no tenderness  Respiratory: decreased breath sounds  Cardiovascular: S1S2 regular, no murmurs  Abdomen: soft, not tender, not distended, positive BS; liver and spleen WNL  Genitourinary: no suprapubic tenderness  Lymphatic: no LN palpable  Musculoskeletal: no muscle tenderness, no joint swelling or tenderness  Extremities: no pedal edema  Neurological/ Psychiatric:  moving all extremities  Skin: no rashes; no palpable lesions    Labs: all available labs reviewed                                   13.9   7.67  )-----------( 233      ( 26 Mar 2020 08:07 )             41.1     03-26    137  |  105  |  18  ----------------------------<  100<H>  4.7   |  28  |  1.23    Ca    8.7      26 Mar 2020 08:07  Phos  3.5     03-25  Mg     2.1     03-25    TPro  7.1  /  Alb  2.7<L>  /  TBili  0.6  /  DBili  x   /  AST  20  /  ALT  19  /  AlkPhos  56  03-25    Culture - Blood (03.24.20 @ 23:32)    Specimen Source: .Blood Blood-Peripheral    Culture Results:   No growth to date.    Culture - Blood (03.24.20 @ 23:32)    Specimen Source: .Blood Blood-Peripheral    Culture Results:   No growth to date.      Radiology: all available radiological tests reviewed  < from: Xray Chest 1 View-PORTABLE IMMEDIATE (03.25.20 @ 01:16) >  EXAM:  XR CHEST PORTABLE IMMED 1V                            PROCEDURE DATE:  03/25/2020          INTERPRETATION:  Exam Date: 3/25/2020 1:16 AM    Chest radiograph (one view)         CLINICAL INFORMATION:  Sepsis    TECHNIQUE:  Single frontal view of the chest was obtained.    COMPARISON: 3/21/2020    FINDINGS/  IMPRESSION:          The lungs are clear.  No pleural abnormality is seen.      Cardiomediastinal silhouette is intact.    < end of copied text >    Advanced directives addressed: full resuscitation

## 2020-03-26 NOTE — PROGRESS NOTE ADULT - ASSESSMENT
54M with no significant PMH presented for worsening dyspnea in the context of COVID-19; admitted for hypoxia    #Acute hypoxic respiratory failure 2/2 COVID-19  BCx ngtd  -Saturating 94-95% on nasal cannula 2L  -Plaquenil and azithromycin (day 2/5)  -Monitor pulse ox  -ID consult noted;  cont azithro, cont CTX, cont, plaquenil  -Continue ceftriaxone for possible superimposed bacterial infection  -Albuterol inhaler PRN  -Tylenol PRN for fevers, robitussin PRN for cough  -Incentive spiromtery  -Hold lasix in light of MOE    #MOE vs CKD  -B/l Cr unknown  -Likely MOE in the setting of dehydration  -Will avoid IV fluids in the setting of COVID-19  -Cr improving    #DVT ppx  -SCD    D/w Dr. Bates

## 2020-03-26 NOTE — PROGRESS NOTE ADULT - ASSESSMENT
53 y/o M with no significant PMH, p/w cough that started on 3/24 and fever. Patient states he went to urgent care center and was told he was positive for COVID. He came to ED because he was having progressively worsening SOB. In ED, patient reported to be hypoxic and was placed on Nasal canula. At bedside during my examination, patient was 92-95% on nasal canula and had no respiratory distress at all. 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 Denies nausea, vomiting, abdominal pain. Here febrile, nl wbc ct, xray clear was given rocephin/azithro/plaquenil.     1. fever. cough. hypoxia. viral syndrome- COVID-19  - on rocephin 1gm daily #2  - on azithromycin #2/5  - abx for superimposed bacterial coverage  - on plaquenil off label use #2/5 day course  - blood cx no growth  - monitor temps  - tolerating abx well so far; no side effects noted  - reason for abx use and side effects reviewed with patient  - supportive care  - fu cbc  2. other issues - care per medicine

## 2020-03-27 LAB
ANION GAP SERPL CALC-SCNC: 4 MMOL/L — LOW (ref 5–17)
BASOPHILS # BLD AUTO: 0.01 K/UL — SIGNIFICANT CHANGE UP (ref 0–0.2)
BASOPHILS NFR BLD AUTO: 0.2 % — SIGNIFICANT CHANGE UP (ref 0–2)
BUN SERPL-MCNC: 19 MG/DL — SIGNIFICANT CHANGE UP (ref 7–23)
CALCIUM SERPL-MCNC: 8.9 MG/DL — SIGNIFICANT CHANGE UP (ref 8.5–10.1)
CHLORIDE SERPL-SCNC: 107 MMOL/L — SIGNIFICANT CHANGE UP (ref 96–108)
CO2 SERPL-SCNC: 27 MMOL/L — SIGNIFICANT CHANGE UP (ref 22–31)
CREAT SERPL-MCNC: 1.16 MG/DL — SIGNIFICANT CHANGE UP (ref 0.5–1.3)
EOSINOPHIL # BLD AUTO: 0.12 K/UL — SIGNIFICANT CHANGE UP (ref 0–0.5)
EOSINOPHIL NFR BLD AUTO: 2 % — SIGNIFICANT CHANGE UP (ref 0–6)
GLUCOSE SERPL-MCNC: 105 MG/DL — HIGH (ref 70–99)
HCT VFR BLD CALC: 41 % — SIGNIFICANT CHANGE UP (ref 39–50)
HGB BLD-MCNC: 13.7 G/DL — SIGNIFICANT CHANGE UP (ref 13–17)
IMM GRANULOCYTES NFR BLD AUTO: 0.5 % — SIGNIFICANT CHANGE UP (ref 0–1.5)
LYMPHOCYTES # BLD AUTO: 0.78 K/UL — LOW (ref 1–3.3)
LYMPHOCYTES # BLD AUTO: 12.8 % — LOW (ref 13–44)
MCHC RBC-ENTMCNC: 31.2 PG — SIGNIFICANT CHANGE UP (ref 27–34)
MCHC RBC-ENTMCNC: 33.4 GM/DL — SIGNIFICANT CHANGE UP (ref 32–36)
MCV RBC AUTO: 93.4 FL — SIGNIFICANT CHANGE UP (ref 80–100)
MONOCYTES # BLD AUTO: 0.51 K/UL — SIGNIFICANT CHANGE UP (ref 0–0.9)
MONOCYTES NFR BLD AUTO: 8.4 % — SIGNIFICANT CHANGE UP (ref 2–14)
NEUTROPHILS # BLD AUTO: 4.64 K/UL — SIGNIFICANT CHANGE UP (ref 1.8–7.4)
NEUTROPHILS NFR BLD AUTO: 76.1 % — SIGNIFICANT CHANGE UP (ref 43–77)
PLATELET # BLD AUTO: 254 K/UL — SIGNIFICANT CHANGE UP (ref 150–400)
POTASSIUM SERPL-MCNC: 4.8 MMOL/L — SIGNIFICANT CHANGE UP (ref 3.5–5.3)
POTASSIUM SERPL-SCNC: 4.8 MMOL/L — SIGNIFICANT CHANGE UP (ref 3.5–5.3)
RBC # BLD: 4.39 M/UL — SIGNIFICANT CHANGE UP (ref 4.2–5.8)
RBC # FLD: 12.1 % — SIGNIFICANT CHANGE UP (ref 10.3–14.5)
SODIUM SERPL-SCNC: 138 MMOL/L — SIGNIFICANT CHANGE UP (ref 135–145)
WBC # BLD: 6.09 K/UL — SIGNIFICANT CHANGE UP (ref 3.8–10.5)
WBC # FLD AUTO: 6.09 K/UL — SIGNIFICANT CHANGE UP (ref 3.8–10.5)

## 2020-03-27 PROCEDURE — 99232 SBSQ HOSP IP/OBS MODERATE 35: CPT | Mod: GC

## 2020-03-27 RX ADMIN — CEFTRIAXONE 1000 MILLIGRAM(S): 500 INJECTION, POWDER, FOR SOLUTION INTRAMUSCULAR; INTRAVENOUS at 12:38

## 2020-03-27 RX ADMIN — AZITHROMYCIN 250 MILLIGRAM(S): 500 TABLET, FILM COATED ORAL at 12:38

## 2020-03-27 RX ADMIN — Medication 200 MILLIGRAM(S): at 17:34

## 2020-03-27 RX ADMIN — Medication 200 MILLIGRAM(S): at 05:41

## 2020-03-27 NOTE — PROGRESS NOTE ADULT - ASSESSMENT
54M with no significant PMH presented for worsening dyspnea in the context of COVID-19; admitted for hypoxia    #Acute hypoxic respiratory failure 2/2 COVID-19  BCx ngtd  -Saturating 94% on RA, cont to monitor pulse ox  -ID consult noted; Cont plaquenil, azithro; CTX for possible superimposed bacterial infection  -Albuterol inhaler PRN  -Tylenol PRN for fevers, robitussin PRN for cough  -Incentive spiromtery  -Hold lasix in light of MOE    #MOE vs CKD  -B/l Cr unknown  -Likely MOE in the setting of dehydration  -Will avoid IV fluids in the setting of COVID-19  -Cr improving    #DVT ppx  -SCD's    D/w Dr. Bates

## 2020-03-27 NOTE — PROGRESS NOTE ADULT - ASSESSMENT
55 y/o M with no significant PMH, p/w cough that started on 3/24 and fever. Patient states he went to urgent care center and was told he was positive for COVID. He came to ED because he was having progressively worsening SOB. In ED, patient reported to be hypoxic and was placed on Nasal canula. At bedside during my examination, patient was 92-95% on nasal canula and had no respiratory distress at all. 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 Denies nausea, vomiting, abdominal pain. Here febrile, nl wbc ct, xray clear was given rocephin/azithro/plaquenil.     1. fever. cough. hypoxia. viral syndrome- COVID-19  - improving   - on rocephin 1gm daily #4/5  - on azithromycin #4/5  - abx for superimposed bacterial coverage  - on plaquenil off label use #3/5  - blood cx no growth  - monitor temps  - tolerating abx well so far; no side effects noted  - reason for abx use and side effects reviewed with patient  - supportive care  - fu cbc  2. other issues - care per medicine

## 2020-03-27 NOTE — PROGRESS NOTE ADULT - SUBJECTIVE AND OBJECTIVE BOX
Date of service: 03-27-20 @ 13:09    pt seen and examined  feels better   temps down, no sob  still w/ cough/less pleuritic cp     ROS: no fever or chills; denies dizziness, no HA, no abdominal pain, no diarrhea or constipation; no dysuria, no urinary frequency, no legs pain, no rashes    MEDICATIONS  (STANDING):  azithromycin   Tablet 250 milliGRAM(s) Oral daily  cefTRIAXone Injectable. 1000 milliGRAM(s) IV Push every 24 hours  hydroxychloroquine 200 milliGRAM(s) Oral every 12 hours  hydroxychloroquine   Oral     Vital Signs Last 24 Hrs  T(C): 36.9 (27 Mar 2020 11:28), Max: 37.1 (26 Mar 2020 21:33)  T(F): 98.5 (27 Mar 2020 11:28), Max: 98.8 (26 Mar 2020 21:33)  HR: 99 (27 Mar 2020 11:28) (57 - 99)  BP: 90/56 (27 Mar 2020 11:28) (90/56 - 110/69)  BP(mean): --  RR: 18 (27 Mar 2020 11:28) (18 - 19)  SpO2: 91% (27 Mar 2020 11:28) (91% - 100%)      PE:  Constitutional: frail looking  HEENT: NC/AT, EOMI, PERRLA, conjunctivae clear; ears and nose atraumatic; pharynx benign  Neck: supple; thyroid not palpable  Back: no tenderness  Respiratory: decreased breath sounds  Cardiovascular: S1S2 regular, no murmurs  Abdomen: soft, not tender, not distended, positive BS; liver and spleen WNL  Genitourinary: no suprapubic tenderness  Lymphatic: no LN palpable  Musculoskeletal: no muscle tenderness, no joint swelling or tenderness  Extremities: no pedal edema  Neurological/ Psychiatric:  moving all extremities  Skin: no rashes; no palpable lesions    Labs: all available labs reviewed                        13.7   6.94  )-----------( 216      ( 25 Mar 2020 07:05 )             40.0     03-25    135  |  103  |  20  ----------------------------<  105<H>  4.1   |  26  |  1.32<H>    Ca    8.8      25 Mar 2020 07:05  Phos  3.5     03-25  Mg     2.1     03-25    TPro  7.1  /  Alb  2.7<L>  /  TBili  0.6  /  DBili  x   /  AST  20  /  ALT  19  /  AlkPhos  56  03-25     LIVER FUNCTIONS - ( 25 Mar 2020 07:05 )  Alb: 2.7 g/dL / Pro: 7.1 gm/dL / ALK PHOS: 56 U/L / ALT: 19 U/L / AST: 20 U/L / GGT: x           Culture - Blood (03.24.20 @ 23:32)    Specimen Source: .Blood Blood-Peripheral    Culture Results:   No growth to date.          Radiology: all available radiological tests reviewed  < from: Xray Chest 1 View-PORTABLE IMMEDIATE (03.25.20 @ 01:16) >  EXAM:  XR CHEST PORTABLE IMMED 1V                            PROCEDURE DATE:  03/25/2020          INTERPRETATION:  Exam Date: 3/25/2020 1:16 AM    Chest radiograph (one view)         CLINICAL INFORMATION:  Sepsis    TECHNIQUE:  Single frontal view of the chest was obtained.    COMPARISON: 3/21/2020    FINDINGS/  IMPRESSION:          The lungs are clear.  No pleural abnormality is seen.      Cardiomediastinal silhouette is intact.    < end of copied text >    Advanced directives addressed: full resuscitation

## 2020-03-27 NOTE — PROGRESS NOTE ADULT - SUBJECTIVE AND OBJECTIVE BOX
54M without pertinent PMH presented for cough, fever, and progressively worsening dyspnea; told at an urgent care that he was positive for COVID-19. Patient was hypoxic in the ED and required nasal cannula. 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    3/27/20 Patient is maintaining SpO2 94-95% on RA. Still c/o chest tightness and pain that makes him cough but has improved. Will ambulate in room more to see if Reynolds has improved. CM spoke with pt about plans for d/c home and proper quarantine measures.    ROS  Gen: no fevers, chills, sweats, weight loss, no fatigue  Visual: no recent changes in vision, no blurriness, no seeing spots  Cardiovascular: no chest pain, no palpitations, no orthopnea, no leg swelling  Respiratory: no shortness of breath, + exertional dyspnea, +cough, no rhinorrhea, no nasal congestion  GI: no nausea, no vomiting, no abdominal pain, no diarrhea, no constipation  Neuro: no headache, no numbness, no weakness, no memory loss    Vital Signs Last 24 Hrs  T(C): 36.9 (27 Mar 2020 11:28), Max: 37.1 (26 Mar 2020 21:33)  T(F): 98.5 (27 Mar 2020 11:28), Max: 98.8 (26 Mar 2020 21:33)  HR: 99 (27 Mar 2020 11:28) (57 - 99)  BP: 90/56 (27 Mar 2020 11:28) (90/56 - 110/69)  BP(mean): --  RR: 18 (27 Mar 2020 11:28) (18 - 19)  SpO2: 91% (27 Mar 2020 11:28) (91% - 100%)    Physical Exam  Gen: WD, WN, NAD  HENT: EOMI, moist mucous membranes  CV: RRR, no r/g/m  Pulm: CTAB, no crackles, wheezes, or rhonchi  Abd: Soft, NT, ND  Ext: No LE edema  Neuro: Grossly intact  Psych: Normal affective range      LABS:                          13.7   6.09  )-----------( 254      ( 27 Mar 2020 07:57 )             41.0     03-27    138  |  107  |  19  ----------------------------<  105<H>  4.8   |  27  |  1.16    Ca    8.9      27 Mar 2020 07:57        PT/INR - ( 24 Mar 2020 23:32 )   PT: 14.0 sec;   INR: 1.25 ratio         PTT - ( 24 Mar 2020 23:32 )  PTT:33.0 sec      < from: Xray Chest 1 View-PORTABLE IMMEDIATE (03.25.20 @ 01:16) >  IMPRESSION:  The lungs are clear.  No pleural abnormality is seen.    < end of copied text >

## 2020-03-28 ENCOUNTER — TRANSCRIPTION ENCOUNTER (OUTPATIENT)
Age: 55
End: 2020-03-28

## 2020-03-28 VITALS
TEMPERATURE: 98 F | RESPIRATION RATE: 18 BRPM | HEART RATE: 74 BPM | OXYGEN SATURATION: 97 % | DIASTOLIC BLOOD PRESSURE: 82 MMHG | SYSTOLIC BLOOD PRESSURE: 128 MMHG

## 2020-03-28 PROCEDURE — 99239 HOSP IP/OBS DSCHRG MGMT >30: CPT | Mod: GC

## 2020-03-28 RX ORDER — HYDROXYCHLOROQUINE SULFATE 200 MG
1 TABLET ORAL
Qty: 2 | Refills: 0
Start: 2020-03-28 | End: 2020-03-28

## 2020-03-28 RX ADMIN — Medication 200 MILLIGRAM(S): at 06:55

## 2020-03-28 RX ADMIN — Medication 200 MILLIGRAM(S): at 17:18

## 2020-03-28 RX ADMIN — CEFTRIAXONE 1000 MILLIGRAM(S): 500 INJECTION, POWDER, FOR SOLUTION INTRAMUSCULAR; INTRAVENOUS at 13:31

## 2020-03-28 RX ADMIN — AZITHROMYCIN 250 MILLIGRAM(S): 500 TABLET, FILM COATED ORAL at 11:33

## 2020-03-28 NOTE — DISCHARGE NOTE PROVIDER - NSDCMRMEDTOKEN_GEN_ALL_CORE_FT
hydroxychloroquine 200 mg oral tablet: 1 tab(s) orally every 12 hours. Take first pill morning of 3/29

## 2020-03-28 NOTE — PROGRESS NOTE ADULT - SUBJECTIVE AND OBJECTIVE BOX
Pt has been seen and examined with FP resident, resident supervised agree with a/p       Patient is a 54y old  Male who presents with a chief complaint of cough (28 Mar 2020 14:18)      PHYSICAL EXAM:  Vital Signs Last 24 Hrs  T(C): 36.4 (28 Mar 2020 11:30), Max: 37.1 (27 Mar 2020 21:26)  T(F): 97.5 (28 Mar 2020 11:30), Max: 98.8 (27 Mar 2020 21:26)  HR: 74 (28 Mar 2020 11:30) (69 - 74)  BP: 128/82 (28 Mar 2020 11:30) (109/75 - 128/82)  BP(mean): --  RR: 18 (28 Mar 2020 11:30) (18 - 19)  SpO2: 97% (28 Mar 2020 11:30) (92% - 97%)  general- comfortable   -rs-aeeb,cta  -cvs-s1s2 normal   -p/a-soft,bs+  -extremity- no asymmetrical swelling noted   -cns- non focal         A/P    #d/c today, time spent 45 minutes

## 2020-03-28 NOTE — DISCHARGE NOTE PROVIDER - NSDCCPCAREPLAN_GEN_ALL_CORE_FT
PRINCIPAL DISCHARGE DIAGNOSIS  Diagnosis: Pneumonia due to 2019 novel coronavirus  Assessment and Plan of Treatment: PRINCIPAL DISCHARGE DIAGNOSIS  Diagnosis: Infection due to 2019 novel coronavirus  Assessment and Plan of Treatment: You had a viral pneumonia caused by COVID-19. You no longer require oxygen and may continue recovery at home. You must maintain quarantine until it has been at least 14 days since your diagnosis (3/25) AND you feel well. Continue to take as needed tylenol for fevers and mucinex as needed for cough  You will be discharged with 2 more doses of hydroxychloroquine to be started the morning of 3/29/20  Follow up with your primary care provider for continued monitoring

## 2020-03-28 NOTE — DISCHARGE NOTE NURSING/CASE MANAGEMENT/SOCIAL WORK - PATIENT PORTAL LINK FT
You can access the FollowMyHealth Patient Portal offered by [facility] by registering at the following website: [email or web address]. By joining [facility]’s FollowMyHealth portal, you will also be able to view your health information using other applications (apps) compatible with our system.

## 2020-03-28 NOTE — DISCHARGE NOTE PROVIDER - HOSPITAL COURSE
3/28/20 Pt seen and examined at bedside.         ROS    Gen: no fevers, chills, sweats, fatigue    Cardiovascular: no chest pain, no palpitations, no orthopnea, no leg swelling    Respiratory: no shortness of breath, no exertional dyspnea, + cough, no rhinorrhea, no nasal congestion    GI: no difficulty swallowing, no nausea, no vomiting, no abdominal pain, no diarrhea    Neuro: no headache, no numbness, no weakness, no memory loss    Psych: no depression, no anxiety, no SI        Vital Signs Last 24 Hrs    T(C): 36.4 (28 Mar 2020 11:30), Max: 37.1 (27 Mar 2020 21:26)    T(F): 97.5 (28 Mar 2020 11:30), Max: 98.8 (27 Mar 2020 21:26)    HR: 74 (28 Mar 2020 11:30) (69 - 74)    BP: 128/82 (28 Mar 2020 11:30) (109/75 - 128/82)    RR: 18 (28 Mar 2020 11:30) (18 - 19)    SpO2: 97% (28 Mar 2020 11:30) (92% - 97%)        Physical Exam     Gen: NAD, comfortable    CV: RRR, nl s1/s2, no M/R/G    Pulm: nl respiratory effort, CTABL, no wheezes/crackles/rhonchi    Abd: normoactive bowel sounds in all 4 quadrants, soft, nontender, nondistended, no rebound, no guarding, no masses    Extremities: no pedal edema, pedal pulses palpable     Neuro: A&Ox3, answering questions appropriately    Pysch: no depression, no SI, no HI        < from: Xray Chest 1 View-PORTABLE IMMEDIATE (03.25.20 @ 01:16) >        IMPRESSION:          The lungs are clear.  No pleural abnormality is seen.          Cardiomediastinal silhouette is intact. 55 y/o M no PMHx presenting with worsening cough, SOB, diarrhea, and fever. Pt tested positive for COVID-19 at urgent care. Patient was febrile and required supplemental O2 with NC. Labs notable for elevated Cr. of 1.57 and lymphopenia. CXR was clear. Pt did not have any significant travel history or known exposure to infected people. RVP was negative. Urine and blood cultures were negative. Infectious disease was consulted and pt was started on azithromycin and ceftriaxone and completed the course. Patient also agreed to begin experimental treatment with hydroxychloroquine. EKG reviewed with QTc <450. Patient status improved significantly during hospitalization. Cr improved to 1.16. At the time of discharge, patient is saturating 95% on room air, has been afebrile >24hrs, and denies dyspnea at rest and on exertion. He has been educated on proper quarantine measures and will follow up with his outpatient provider. He will be discharged with 2 more doses of hydroxychloroquine to complete a five day course. He is hemodynamically stable and medically cleared for discharge.        3/28/20 Pt seen and examined at bedside. No c/o fever, SOB, or Reynolds. Still has mild cough but feels much improved. O2 Sat 95% on RA as well as on ambulation. Will complete one more day of hydroxychloroquine at home. D/C plan home today.        ROS    Gen: no fevers, chills, sweats, fatigue    Cardiovascular: no chest pain, no palpitations, no orthopnea, no leg swelling    Respiratory: no shortness of breath, no exertional dyspnea, + cough, no rhinorrhea, no nasal congestion    GI: no difficulty swallowing, no nausea, no vomiting, no abdominal pain, no diarrhea    Neuro: no headache, no numbness, no weakness, no memory loss    Psych: no depression, no anxiety, no SI        Vital Signs Last 24 Hrs    T(C): 36.4 (28 Mar 2020 11:30), Max: 37.1 (27 Mar 2020 21:26)    T(F): 97.5 (28 Mar 2020 11:30), Max: 98.8 (27 Mar 2020 21:26)    HR: 74 (28 Mar 2020 11:30) (69 - 74)    BP: 128/82 (28 Mar 2020 11:30) (109/75 - 128/82)    RR: 18 (28 Mar 2020 11:30) (18 - 19)    SpO2: 97% (28 Mar 2020 11:30) (92% - 97%)        Physical Exam     Gen: NAD, comfortable    CV: RRR, nl s1/s2, no M/R/G    Pulm: nl respiratory effort, CTABL, no wheezes/crackles/rhonchi    Abd: normoactive bowel sounds in all 4 quadrants, soft, nontender, nondistended, no rebound, no guarding, no masses    Extremities: no pedal edema, pedal pulses palpable     Neuro: A&Ox3, answering questions appropriately    Pysch: no depression, no SI, no HI        < from: Xray Chest 1 View-PORTABLE IMMEDIATE (03.25.20 @ 01:16) >        IMPRESSION:          The lungs are clear.  No pleural abnormality is seen.          Cardiomediastinal silhouette is intact.

## 2020-03-28 NOTE — DISCHARGE NOTE NURSING/CASE MANAGEMENT/SOCIAL WORK - NSDCPNINST_GEN_ALL_CORE
Please take medications as prescribed. Return to Emergency Department for worsening signs or symptoms. Follow isolation precautions as directed.

## 2020-03-28 NOTE — DISCHARGE NOTE PROVIDER - CARE PROVIDER_API CALL
Nic Gee (DO)  Internal Medicine  [address], [address]  [place] [address]  Phone: [phone]  Fax: [phone]  Follow Up Time: 1-3 days

## 2020-03-30 LAB
CULTURE RESULTS: SIGNIFICANT CHANGE UP
CULTURE RESULTS: SIGNIFICANT CHANGE UP
SPECIMEN SOURCE: SIGNIFICANT CHANGE UP
SPECIMEN SOURCE: SIGNIFICANT CHANGE UP

## 2020-03-31 NOTE — CDI QUERY NOTE - NSCDIOTHERTXTBX_GEN_ALL_CORE_HH
Per H+P patient with Sepsis  patient + COVID-19    On admit Wbc= 7.63  CR protein= 17.18  Temp= 101  HR= 100  RR= 22  BP= 125/88    Patient was on Ceftriaxone IV qday    On discharge note : viral pneumonia caused by COVID-19.     Please clarify the Status of the Sepsis  a) Sepsis , poa, due to viral pneumonia caused by COVID-19 and now resolved  b) Early Sepsis, poa, due to COVID-19 pneumonia, and now resolved  c) No clinical indication of Sepsis, Sepsis ruled-out  d) other, please clarify

## 2020-04-07 DIAGNOSIS — B97.29 OTHER CORONAVIRUS AS THE CAUSE OF DISEASES CLASSIFIED ELSEWHERE: ICD-10-CM

## 2020-04-07 DIAGNOSIS — A41.89 OTHER SPECIFIED SEPSIS: ICD-10-CM

## 2020-04-07 DIAGNOSIS — J12.9 VIRAL PNEUMONIA, UNSPECIFIED: ICD-10-CM

## 2020-04-07 DIAGNOSIS — J96.01 ACUTE RESPIRATORY FAILURE WITH HYPOXIA: ICD-10-CM

## 2020-04-07 DIAGNOSIS — E86.0 DEHYDRATION: ICD-10-CM

## 2020-04-07 DIAGNOSIS — N17.9 ACUTE KIDNEY FAILURE, UNSPECIFIED: ICD-10-CM

## 2020-06-04 ENCOUNTER — OUTPATIENT (OUTPATIENT)
Dept: OUTPATIENT SERVICES | Facility: HOSPITAL | Age: 55
LOS: 1 days | End: 2020-06-04
Payer: COMMERCIAL

## 2020-06-04 VITALS — HEIGHT: 69 IN | WEIGHT: 160.06 LBS

## 2020-06-04 DIAGNOSIS — M25.562 PAIN IN LEFT KNEE: ICD-10-CM

## 2020-06-04 DIAGNOSIS — Z01.818 ENCOUNTER FOR OTHER PREPROCEDURAL EXAMINATION: ICD-10-CM

## 2020-06-04 DIAGNOSIS — S83.232A COMPLEX TEAR OF MEDIAL MENISCUS, CURRENT INJURY, LEFT KNEE, INITIAL ENCOUNTER: ICD-10-CM

## 2020-06-04 PROCEDURE — G0463: CPT

## 2020-06-04 NOTE — H&P PST ADULT - NSICDXPASTMEDICALHX_GEN_ALL_CORE_FT
PAST MEDICAL HISTORY:  Close exposure to COVID-19 virus     No pertinent past medical history PAST MEDICAL HISTORY:  COVID-19 virus infection March2020 , admitted to [facility] 7 days PAST MEDICAL HISTORY:  COVID-19 virus infection March2020 , admitted to [facility] 7 days

## 2020-06-04 NOTE — H&P PST ADULT - HISTORY OF PRESENT ILLNESS
55 y/o M with no significant PMH, of covid Denies any past medical history and denies taking any medications at home 53 y/o M with no significant PMH, Had Covid  in [facility], admitted in [facility] for few days. Denies any pneumonia Denies any past medical history and denies taking any medications at home.  Interviewed over the phone due to COVID pandemic. H&P will be completed on the day of procedure. COVID testing will be done in [facility].  Scheduled for left knee arthroscopy on 6/11/20 by Dr Greer. As per pt, DR Greer asked him to do the covid testing at [facility].

## 2020-06-04 NOTE — H&P PST ADULT - ASSESSMENT
55 y/o M with no significant PMH, Had Covid  in [facility], admitted in [facility] for few days. Denies any pneumonia Denies any past medical history and denies taking any medications at home.  Interviewed over the phone due to COVID pandemic. H&P will be completed on the day of procedure. COVID testing will be done in [facility].  Scheduled for left knee arthroscopy on 6/11/20 by Dr Greer. As per pt, DR Greer asked him to do the covid testing at [facility].

## 2020-06-04 NOTE — H&P PST ADULT - NSANTHOSAYNRD_GEN_A_CORE
No. ENOCH screening performed.  STOP BANG Legend: 0-2 = LOW Risk; 3-4 = INTERMEDIATE Risk; 5-8 = HIGH Risk

## 2020-06-10 ENCOUNTER — OUTPATIENT (OUTPATIENT)
Dept: OUTPATIENT SERVICES | Facility: HOSPITAL | Age: 55
LOS: 1 days | End: 2020-06-10
Payer: COMMERCIAL

## 2020-06-10 ENCOUNTER — TRANSCRIPTION ENCOUNTER (OUTPATIENT)
Age: 55
End: 2020-06-10

## 2020-06-10 DIAGNOSIS — Z11.59 ENCOUNTER FOR SCREENING FOR OTHER VIRAL DISEASES: ICD-10-CM

## 2020-06-10 LAB — SARS-COV-2 RNA SPEC QL NAA+PROBE: SIGNIFICANT CHANGE UP

## 2020-06-10 PROCEDURE — 87635 SARS-COV-2 COVID-19 AMP PRB: CPT

## 2020-06-10 NOTE — ASU PATIENT PROFILE, ADULT - IS PATIENT PREGNANT?
[facility] ED  Emergency Department Encounter  Emergency Medicine Resident     Pt Name: Jeraline Boast  MRN: [id number]  [place] 1935  Date of evaluation: 7/15/18  PCP:  Kinjal Pineda MD    CHIEF COMPLAINT       Chief Complaint   Patient presents with    Dizziness    Rectal Bleeding       HISTORY OF PRESENT ILLNESS  (Location/Symptom, Timing/Onset, Context/Setting, Quality, Duration, Modifying Factors, Severity.)      Jeraline Boast is a 79 y.o. female who presents with    Acute melanotic rectal bleeding since yesterday one episode. Patient states that it was dark and tarry patient's stool and is with one stool. Nothing else aggravates or relieves it except she had prior GAVE disease and required transfusion. Not associated with any hematemesis or hemoptysis or any vomitting. Has dizziness,  lightheadedness, and shortness of breath on exertion palpitations. The symptoms worsen when she stands. She's has nausea without any vomiting Last colonoscopy on 2010. Moderate severity. Denied previous GI bleeds, liver problems and alcohol abuse except prior GI bleed    Denied weight loss, fevers, chills, night sweats unintentional weight loss, diarrhea,  family history of rectal cancer, change in urination or hematuria, nsaids, steroids use, anticoaglants, abdominal pain and vaginal discharge . Patient states that she's been drinking water well with no vomiting or diarrhea    PAST MEDICAL / SURGICAL / SOCIAL / FAMILY HISTORY      has a past medical history of Anemia; Anxiety; Depression; Fibromyalgia; Gastritis; GERD (gastroesophageal reflux disease); Hyperlipidemia; Hypertension; Lumbar disc disease; Neuropathy ([address] Numbness; Osteoarthritis; Watermelon stomach; and Wears glasses. has a past surgical history that includes Cholecystectomy; nasal endoscopy; Hemorrhoid surgery; back surgery (6/17/13);  Cataract removal; Upper gastrointestinal endoscopy; and pr esophagogastroduodenoscopy EXAM   (up to 7 for level 4, 8 or more for level 5)      INITIAL VITALS:   BP (!) 163/70   Pulse 104   Temp 98.6 °F (37 °C) (Oral)   Resp 20   Ht 5' 4\" (1.626 m)   Wt 150 lb (68 kg)   SpO2 100%   BMI 25.75 kg/m²     Vital signs reviewed. Nursing note reviewed    Constitutional: Well developed; well-nourished   HENT: Normocephalic, atraumatic I mucous membranes normal pallor   Eyes: WILLIAM Conj nl  Neck: ROM nl Supple  Cardiovascular: Normal Rate, Regular Rhythm No murmurs, rubs, gallops  Pulmonary/Chest Wall: Effort normal limit, clear to ausculte bilaterally   Abdomen: Soft, non-tender, non-distended bowel sounds present no pulsatile mass  Musculoskeletal: Normal ROM, no edema  Neurological: Alert and Oriented x3,   Skin: Warm and dry pale to the palmar creases however her skin is Sim   Psych: Mood/affect normal, behavior normal  Guiac negative     DIFFERENTIAL  DIAGNOSIS     PLAN (LABS / IMAGING / EKG):  Orders Placed This Encounter   Procedures    XR CHEST STANDARD (2 VW)    CBC Auto Differential    Protime-INR    APTT    BASIC METABOLIC PANEL    Telemetry monitoring    Pulse oximetry, continuous    POCT troponin    POCT troponin    EKG 12 Lead       MEDICATIONS ORDERED:  No orders of the defined types were placed in this encounter.       DDX: PUD, infectious, nsaid, stress, radiation, cmv, varices, malllory neela tear, dieulafox lesion, avm, malignancy, aortaenetric fistula, beet ingestion, bisthmuths, diverticulosis, vascular ectasia, ischemic colitis, mesenteric ischemia, vasculitis, epitaxis,  vascular constriction, carcinommas, angiodyplasia, hemmorhoids, ibd, polyps    DIAGNOSTIC RESULTS / EMERGENCY DEPARTMENT COURSE / MDM     LABS:  Results for orders placed or performed during the hospital encounter of 07/15/18   POCT troponin   Result Value Ref Range    POC Troponin I 0.00 0.00 - 0.10 ng/mL    POC Troponin Interp       The Troponin-I (POC) results cannot be compared to the Troponin-T results. IMPRESSION: Gi bleed hemmoclut negative, ct scan secondary to vague symptoms, dizziness,  Left side neck pain, neuro intact    RADIOLOGY:  Xr Chest Standard (2 Vw)    Result Date: 7/15/2018  EXAMINATION: TWO VIEWS OF THE CHEST 7/15/2018 12:41 pm COMPARISON: Chest December 31, 2017. HISTORY: ORDERING SYSTEM PROVIDED HISTORY: shortness fo breath TECHNOLOGIST PROVIDED HISTORY: Reason for exam:->shortness fo breath FINDINGS: Heart appears normal in size. Lungs are clear. No free air. Osseous structures demonstrate degenerative change. No acute process. Ct Head Wo Contrast    Result Date: 7/15/2018  EXAMINATION: CT OF THE HEAD WITHOUT CONTRAST  7/15/2018 3:21 pm TECHNIQUE: CT of the head was performed without the administration of intravenous contrast. Dose modulation, iterative reconstruction, and/or weight based adjustment of the mA/kV was utilized to reduce the radiation dose to as low as reasonably achievable. COMPARISON: June 1, 2006 HISTORY: ORDERING SYSTEM PROVIDED HISTORY: Stroke TECHNOLOGIST PROVIDED HISTORY: Has a \"code stroke\" or \"stroke alert\" been called? ->No FINDINGS: BRAIN/VENTRICLES: No acute intracranial hemorrhage. No mass effect. No midline shift. Linear focus of hyperdensity within the left posterior parietal lobe most likely represents a vessel. Mild prominence of the ventricles and sulci is consistent with atrophy. Mild periventricular and subcortical white matter hypodensities are nonspecific but likely represent microvascular disease. ORBITS: The visualized portion of the orbits demonstrate no acute abnormality. SINUSES: The visualized paranasal sinuses and mastoid air cells demonstrate no acute abnormality. SOFT TISSUES/SKULL:  No acute abnormality of the visualized skull or soft tissues. No acute intracranial abnormality. Mild microvascular disease. Prominent nonspecific vein within the left posterior parietal lobe is unchanged since 2006.   Given the appearance to patient  PROCEDURES:  None    CONSULTS:  None    CRITICAL CARE:  None    FINAL IMPRESSION      1. Lightheaded    2. Rectal bleeding    3. Hypertension, unspecified type    4. Nausea            DISPOSITION / PLAN     DISPOSITION        PATIENT REFERRED TO:  No follow-up provider specified.     DISCHARGE MEDICATIONS:  New Prescriptions    No medications on file       Marilyn Tejada MD  Emergency Medicine Resident    (Please note that portions of this note were completed with a voice recognition program.  Efforts were made to edit the dictations but occasionally words are mis-transcribed.)              Marilyn Tejada MD  Resident  07/15/18 5708 not applicable (Male)

## 2020-06-11 ENCOUNTER — RESULT REVIEW (OUTPATIENT)
Age: 55
End: 2020-06-11

## 2020-06-11 ENCOUNTER — OUTPATIENT (OUTPATIENT)
Dept: OUTPATIENT SERVICES | Facility: HOSPITAL | Age: 55
LOS: 1 days | End: 2020-06-11
Payer: COMMERCIAL

## 2020-06-11 VITALS
RESPIRATION RATE: 14 BRPM | OXYGEN SATURATION: 99 % | WEIGHT: 160.06 LBS | TEMPERATURE: 98 F | HEIGHT: 69 IN | SYSTOLIC BLOOD PRESSURE: 117 MMHG | HEART RATE: 52 BPM | DIASTOLIC BLOOD PRESSURE: 85 MMHG

## 2020-06-11 VITALS
HEART RATE: 76 BPM | RESPIRATION RATE: 16 BRPM | OXYGEN SATURATION: 97 % | SYSTOLIC BLOOD PRESSURE: 138 MMHG | DIASTOLIC BLOOD PRESSURE: 98 MMHG

## 2020-06-11 DIAGNOSIS — S83.232A COMPLEX TEAR OF MEDIAL MENISCUS, CURRENT INJURY, LEFT KNEE, INITIAL ENCOUNTER: ICD-10-CM

## 2020-06-11 DIAGNOSIS — Z01.818 ENCOUNTER FOR OTHER PREPROCEDURAL EXAMINATION: ICD-10-CM

## 2020-06-11 PROCEDURE — 88304 TISSUE EXAM BY PATHOLOGIST: CPT | Mod: 26

## 2020-06-11 PROCEDURE — 88304 TISSUE EXAM BY PATHOLOGIST: CPT

## 2020-06-11 PROCEDURE — 29881 ARTHRS KNE SRG MNISECTMY M/L: CPT | Mod: LT

## 2020-06-11 RX ORDER — CEFAZOLIN SODIUM 1 G
2000 VIAL (EA) INJECTION ONCE
Refills: 0 | Status: COMPLETED | OUTPATIENT
Start: 2020-06-11 | End: 2020-06-11

## 2020-06-11 RX ORDER — OXYCODONE HYDROCHLORIDE 5 MG/1
5 TABLET ORAL ONCE
Refills: 0 | Status: DISCONTINUED | OUTPATIENT
Start: 2020-06-11 | End: 2020-06-11

## 2020-06-11 RX ORDER — SODIUM CHLORIDE 9 MG/ML
1000 INJECTION, SOLUTION INTRAVENOUS
Refills: 0 | Status: DISCONTINUED | OUTPATIENT
Start: 2020-06-11 | End: 2020-06-11

## 2020-06-11 RX ORDER — ACETAMINOPHEN 500 MG
1000 TABLET ORAL ONCE
Refills: 0 | Status: COMPLETED | OUTPATIENT
Start: 2020-06-11 | End: 2020-06-11

## 2020-06-11 RX ORDER — METOCLOPRAMIDE HCL 10 MG
5 TABLET ORAL ONCE
Refills: 0 | Status: DISCONTINUED | OUTPATIENT
Start: 2020-06-11 | End: 2020-06-11

## 2020-06-11 RX ORDER — HYDROMORPHONE HYDROCHLORIDE 2 MG/ML
0.5 INJECTION INTRAMUSCULAR; INTRAVENOUS; SUBCUTANEOUS
Refills: 0 | Status: DISCONTINUED | OUTPATIENT
Start: 2020-06-11 | End: 2020-06-11

## 2020-06-11 RX ORDER — OXYCODONE HYDROCHLORIDE 5 MG/1
1 TABLET ORAL
Qty: 12 | Refills: 0
Start: 2020-06-11 | End: 2020-06-12

## 2020-06-11 RX ADMIN — HYDROMORPHONE HYDROCHLORIDE 0.5 MILLIGRAM(S): 2 INJECTION INTRAMUSCULAR; INTRAVENOUS; SUBCUTANEOUS at 10:55

## 2020-06-11 RX ADMIN — HYDROMORPHONE HYDROCHLORIDE 0.5 MILLIGRAM(S): 2 INJECTION INTRAMUSCULAR; INTRAVENOUS; SUBCUTANEOUS at 11:21

## 2020-06-11 RX ADMIN — SODIUM CHLORIDE 75 MILLILITER(S): 9 INJECTION, SOLUTION INTRAVENOUS at 11:10

## 2020-06-11 RX ADMIN — Medication 400 MILLIGRAM(S): at 10:58

## 2020-06-11 RX ADMIN — OXYCODONE HYDROCHLORIDE 5 MILLIGRAM(S): 5 TABLET ORAL at 11:35

## 2020-06-11 RX ADMIN — HYDROMORPHONE HYDROCHLORIDE 0.5 MILLIGRAM(S): 2 INJECTION INTRAMUSCULAR; INTRAVENOUS; SUBCUTANEOUS at 11:09

## 2020-06-11 NOTE — ASU DISCHARGE PLAN (ADULT/PEDIATRIC) - CARE PROVIDER_API CALL
Esau Greer)  Orthopaedic Surgery Surgery  [address] [place] [address]  Phone: [phone]  Fax: [phone]  Follow Up Time:

## 2020-06-11 NOTE — ASU DISCHARGE PLAN (ADULT/PEDIATRIC) - ASU DC SPECIAL INSTRUCTIONSFT
Follow up with Dr. Greer tomorrow, dressing will be changed in the office. Call office for appointment. Take medications as prescribed. Keep dressing clean, dry, and intact. Rest, ice, and elevate affected extremity. Weight bearing as tolerated Left lower extremity.

## 2020-06-12 LAB — SURGICAL PATHOLOGY STUDY: SIGNIFICANT CHANGE UP

## 2020-09-03 NOTE — PRE-OP CHECKLIST - VIA
"[facility]  Cardiology Progress Note    Patient Identification:  Name: Narcisa Hammer  Age: 61 y.o.  Sex: female  :  1959  MRN: [id number]                 Follow Up / Chief Complaint: Management recommendations cardiac source of thrombus    Interval History: Echo revealed EF 60%, LVH mild, LV diastolic dysfunction normal, see full report as below.       Subjective: She states she feels much better today.  Denies chest pain, shortness of breath, syncope and near syncope.      Objective:    Past Medical History:  Past Medical History:   Diagnosis Date   • Diabetes mellitus (CMS/HCC)    • UTI (urinary tract infection)      Past Surgical History:  History reviewed. No pertinent surgical history.     Social History:   Social History     Tobacco Use   • Smoking status: Former Smoker     Types: Cigarettes     Last attempt to quit: 2000     Years since quittin.6   Substance Use Topics   • Alcohol use: Yes      Family History:  History reviewed. No pertinent family history.       Allergies:  Allergies   Allergen Reactions   • Dilaudid [Hydromorphone Hcl] Nausea And Vomiting   • Doxycycline Hives   • Penicillins Unknown - Low Severity   • Sulfa Antibiotics Hives     Scheduled Meds:    aspirin 81 mg Daily   insulin lispro 0-7 Units TID AC   multivitamin with minerals 1 tablet Daily   pantoprazole 40 mg Q AM   sodium chloride 10 mL Q12H           INTAKE AND OUTPUT:    Intake/Output Summary (Last 24 hours) at 9/3/2020 1424  Last data filed at 9/3/2020 1321  Gross per 24 hour   Intake 1960 ml   Output 1150 ml   Net 810 ml       ROS  Constitutional: Awake and alert, no fever. No nosebleeds  Abdomen           no abdominal pain   Cardiac              no chest pain  Pulmonary          no shortness of breath      /63 (BP Location: Right arm, Patient Position: Sitting)   Pulse 58   Temp 98 °F (36.7 °C) (Oral)   Resp 16   Ht 172.7 cm (67.99\")   Wt 97.1 kg (214 lb)   SpO2 92%   BMI 32.55 kg/m²   "   General appearance: No acute changes   Neck: Trachea midline; NECK, supple, no thyromegaly or lymphadenopathy   Lungs: Normal size and shape, normal breath sounds, equal distribution of air, no rales and rhonchi   CV: S1-S2 regular, no murmurs, no rub, no gallop   Abdomen: Soft, non-tender; no masses , no abnormal abdominal sounds   Extremities: No deformity , normal color , no peripheral edema   Skin: Normal temperature, turgor and texture; no rash, ulcers            Cardiographics  Telemetry:   SR      ECG:     Echocardiogram:   Interpretation Summary     · Left ventricular systolic function is normal. Calculated EF = 68%  · Left ventricular wall thickness is consistent with mild concentric hypertrophy.  · Left ventricular diastolic function is normal  · Normal right ventricular cavity size and systolic function noted.  · Saline test results are negative.  · There is no evidence of pericardial effusion.     Study Result     CT ANGIOGRAM OF THE CHEST. MULTIPLE CORONAL, SAGITTAL, AND 3D  RECONSTRUCTIONS     HISTORY: Evaluate for proximal source of splenic artery thromboembolism.     TECHNIQUE: Radiation dose reduction techniques were utilized, including  automated exposure control and exposure modulation based on body size.   CT angiogram of the chest was performed. Multiple coronal, sagittal, and  3-D reconstruction images were obtained.      COMPARISON:CT AP 09/01/2020.     FINDINGS:      CHEST:  There is no hilar, mediastinal or axillary adenopathy by size criteria.  Heart is normal in size. There is no significant pericardial effusion.     There is no pulmonary consolidation, pleural effusion or pneumothorax.  Sub-6 mm pulmonary nodule within the lingula.     No significant atherosclerotic disease is visualized within the thoracic  aorta. There are a few tiny foci of calcification along the aortic arch.  While no discrete dissection flap is visualized, please note evaluation  is suboptimal due to the lack of  cardiac gating performed.     ABDOMEN AND PELVIS:  Hepatic steatosis is present. The appendix is unremarkable.     There are no findings of small bowel obstruction.     While this exam is not tailored for detailed evaluation of the abdominal  viscera due to contrast timing, no gross abnormality is visualized  within the gallbladder, pancreas, adrenal glands or kidneys. There is no  hydronephrosis.     Wedge-shaped area of hypoenhancement with associated volume loss  projects through the lateral aspect of the spleen peripherally, as seen  on recent CT.     There is no abdominopelvic adenopathy by size criteria. There is no free  intraperitoneal air or fluid.     The origins of the celiac, superior mesenteric and bilateral renal  arteries are widely patent. Minimal atherosclerotic calcification is  present within the right common iliac artery and distal aorta without  significant stenosis.     BONE WINDOWS: There are no suspicious lytic or blastic osseous lesions.     IMPRESSION:  1.  Minimal atherosclerotic calcification is present within the aortic  arch, distal aorta and right common iliac artery without significant  stenosis.  2.  Findings of evolving splenic infarct, as before.  3.  Hepatic steatosis.  4.  Sub-6 mm pulmonary nodule within the lingula. If this patient is at  increased risk for lung cancer, follow-up chest CT in 12 months can be  considered to ensure stability. If this patient is not at increased risk  for lung cancer, no further follow-up is necessary per Fleischner  criteria.  5.  Other findings as above.     This report was finalized on 9/2/2020 5:26 PM by Dr. Richar Tillman M.D.          Lab Review           Results from last 7 days   Lab Units 09/03/20  0657   SODIUM mmol/L 137   POTASSIUM mmol/L 3.8   BUN mg/dL 6*   CREATININE mg/dL 0.62   CALCIUM mg/dL 9.4        Results from last 7 days   Lab Units 09/03/20  0657 09/02/20  0832 09/01/20  0236   WBC 10*3/mm3 6.90 6.67 6.91   HEMOGLOBIN g/dL  "11.4* 11.6* 12.7   HEMATOCRIT % 34.4 34.0 37.7   PLATELETS 10*3/mm3 188 170 184     Results from last 7 days   Lab Units 09/03/20  0657 09/02/20  0832 09/01/20 2038 09/01/20  0236   INR   --   --   --   --  0.98   APTT seconds 82.2* 71.5* 68.5*   < > 26.2    < > = values in this interval not displayed.       Estimated Creatinine Clearance: 114.7 mL/min (by C-G formula based on SCr of 0.62 mg/dL).    The following medical decision was discussed in detail with Dr. Ngo    Assessment:  Splenic infarct etiology questionable   History of angina in the past  Sleep apnea  Diabetes  PVD      Plan:  We will proceed with a SHERYL, procedure risk and options have been explained.      )9/3/2020  MD TESSIE Pollard/Transcription:   \"Dictated utilizing Dragon dictation\".     " stretcher

## 2021-07-06 NOTE — ED PROVIDER NOTE - DISPOSITION TYPE
DISCHARGE Spironolactone Pregnancy And Lactation Text: This medication can cause feminization of the male fetus and should be avoided during pregnancy. The active metabolite is also found in breast milk.

## 2022-03-21 NOTE — ED ADULT NURSE NOTE - IS THE PATIENT ABLE TO BE SCREENED?
[Initial Evaluation] : an initial evaluation [Patient] : patient [Mother] : mother [FreeTextEntry1] : intoeing Yes

## 2022-03-30 NOTE — ED ADULT TRIAGE NOTE - CCCP TRG CHIEF CMPLNT
Subjective:     Katalina Chery is a 71 y.o. female seen for follow-up of diabetes. Triglcyrides very hi but now much improved    She has had hypoglycemic attacks. .no  Blood sugar control has been ok    Lab Results   Component Value Date/Time    Hemoglobin A1c 7.9 (H) 02/24/2022 10:17 AM    Hemoglobin A1c 7.5 (H) 09/24/2021 11:41 AM    Hemoglobin A1c 8.1 (H) 06/28/2021 09:54 AM    Glucose 85 03/24/2022 11:03 AM    Glucose (POC) 146 (H) 02/18/2018 11:22 AM    Microalbumin/Creat ratio (mg/g creat) 153 (H) 10/08/2021 08:48 PM    Microalbumin,urine random 6.39 10/08/2021 08:48 PM    LDL, calculated 10 03/24/2022 11:03 AM    Creatinine 1.32 (H) 03/24/2022 11:03 AM       She has diabetes, hypertension and hyperlipidemia. Saw nutritionist eating better    Katalina Chery has the additional concern of labs taking meds for it    Reports taking blood pressure medications without side affects. No complaints of exertional chest pain, excessive shortness of breath or focal weakness. Minimal swelling in lower legs or dizziness with standing. Diet and Lifestyle: follows a diabetic diet regularly, nonsmoker.     Patient Active Problem List    Diagnosis Date Noted    Bilateral carpal tunnel syndrome 03/29/2022    Tachycardia 03/29/2022    CRI (chronic renal insufficiency), stage 3 (moderate) ([address] 03/10/2021    Asthma 09/02/2020    Gout 05/01/2019    Severe obesity ([address] 12/17/2018    Type 2 diabetes mellitus with diabetic neuropathy ([address] 03/05/2018    Post-menopausal bleeding 11/01/2017    Essential hypertension 05/04/2017    Diabetes mellitus with proteinuria ([address] 05/04/2017    Traumatic amputation of left leg above knee ([address] 11/21/2016    Type 2 diabetes mellitus with hyperglycemia ([address] 04/06/2016    Incomplete uterovaginal prolapse 06/03/2013    Phantom limb pain (HCC) 05/14/2012    Stress incontinence 04/13/2011     Allergies   Allergen Reactions    Hydrocodone-Acetaminophen Hives, Itching and Nausea Only     Patient states she can take tylenol #3 without problem.  Keflex [Cephalexin] Itching    Lisinopril Cough     Past Medical History:   Diagnosis Date    Asthma     PATIENT DENIES    Bone spur of ankle 3/30/12    right ankle    Chronic pain     DM (di[facility] mellitus) [facility]     GERD (gastroesophageal reflux disease)     Gout     Hypertension     OA (osteoarthritis) of knee 3/30/12    right knee    Sleep apnea     Unilateral AKA ([facility] 1993    left     Social History     Tobacco Use    Smoking status: Former Smoker     Packs/day: 0.50     Years: 15.00     Pack years: 7.50     Quit date: 3/30/2009     Years since quittin.0    Smokeless tobacco: Never Used   Substance Use Topics    Alcohol use: No     Alcohol/week: 0.0 standard drinks        Lab Results   Component Value Date/Time    Cholesterol, total 97 2022 11:03 AM    HDL Cholesterol 21 2022 11:03 AM    LDL, calculated 10 2022 11:03 AM    Triglyceride 330 (H) 2022 11:03 AM    CHOL/HDL Ratio 4.6 2022 11:03 AM        Review of Systems  Pertinent items are noted in HPI. Objective:     Significant for the following: WNL amputee  Visit Vitals  /83 (BP 1 Location: Left arm, BP Patient Position: Sitting, BP Cuff Size: Adult)   Pulse (!) 103   Temp 98.6 °F (37 °C) (Temporal)   Resp 18   Ht 5' 3\" (1.6 m)   Wt 174 lb (78.9 kg) Comment: with L/leg prothesis   LMP 2003   SpO2 98%   BMI 30.82 kg/m²     WD WN female NAD      Lab review: labs reviewed, I note that triglycerides hi. Assessment/Plan:     Follow-up diabetes well controlled, stable. Diabetic issues reviewed with her: labs immediately prior to next visit. ICD-10-CM ICD-9-CM    1. Type 2 diabetes mellitus with diabetic neuropathy, with long-term current use of insulin ([facility])  E11.40 250.60 glucose blood VI test strips (blood glucose test) strip    Z79.4 357.2      V58.67    2.  Acute idiopathic gout, unspecified site  M10.00 274.01 shortness of breath colchicine 0.6 mg tablet   3. Hypertriglyceridemia, familial  E78.1 272.1 rosuvastatin (CRESTOR) 40 mg tablet      fenofibrate (LOFIBRA) 160 mg tablet   4. Type 2 diabetes mellitus with hyperglycemia, with long-term current use of insulin (HCC)  E11.65 250.00 insulin aspart protamine/insulin aspart (NOVOLOG MIX 70/30) 100 unit/mL (70-30) inpn    Z79.4 790.29      V58.67    5. Diabetes mellitus with proteinuria (HCC)  E11.29 250.40 losartan (COZAAR) 25 mg tablet    R80.9 791.0 HEMOGLOBIN A1C WITH EAG   6. Essential hypertension  L74 511.9 METABOLIC PANEL, BASIC     Orders Placed This Encounter    METABOLIC PANEL, BASIC     Standing Status:   Future     Standing Expiration Date:   2022    HEMOGLOBIN A1C WITH EAG     Standing Status:   Future     Standing Expiration Date:   3/30/2023    glucose blood VI test strips (blood glucose test) strip     Sig: Up to 3 times daily     Dispense:  100 Strip     Refill:  3     accu chek    amitriptyline (ELAVIL) 50 mg tablet     Sig: Take 1 Tablet by mouth nightly. Dispense:  90 Tablet     Refill:  1    rosuvastatin (CRESTOR) 40 mg tablet     Sig: Take 1 Tablet by mouth nightly. Dispense:  90 Tablet     Refill:  1    fenofibrate (LOFIBRA) 160 mg tablet     Sig: Take 1 Tablet by mouth daily. Dispense:  90 Tablet     Refill:  1    colchicine 0.6 mg tablet     Sig: Take 1 Tablet by mouth daily. As needed for gout     Dispense:  30 Tablet     Refill:  2    insulin aspart protamine/insulin aspart (NOVOLOG MIX 70/30) 100 unit/mL (70-30) inpn     Si Units by SubCUTAneous route ACB/HS. Dispense:  10 Pen     Refill:  5    losartan (COZAAR) 25 mg tablet     Sig: Take 1 Tablet by mouth daily. Dispense:  90 Tablet     Refill:  1         Chronic Conditions Addressed Today     1.  Type 2 diabetes mellitus with diabetic neuropathy (HCC)     Relevant Medications     glucose blood VI test strips (blood glucose test) strip        Orders Placed This Encounter    glucose blood VI test strips (blood glucose test) strip     Sig: Up to 3 times daily     Dispense:  100 Strip     Refill:  3     accu chek     Current Outpatient Medications   Medication Sig Dispense Refill    glucose blood VI test strips (blood glucose test) strip Up to 3 times daily 100 Strip 3    gabapentin (NEURONTIN) 100 mg capsule 1 to 2 tablets 3 times daily as needed headache  Indications: neuropathic pain 180 Capsule 2    colchicine 0.6 mg tablet Take 1 Tablet by mouth daily. 30 Tablet 2    fenofibrate (LOFIBRA) 160 mg tablet Take 1 Tablet by mouth daily. 30 Tablet 5    amitriptyline (ELAVIL) 10 mg tablet Up to 4 at night 120 Tablet 2    allopurinoL (ZYLOPRIM) 100 mg tablet Take 1 Tablet by mouth daily. 90 Tablet 3    indomethacin (INDOCIN) 25 mg capsule Take  by mouth daily.  empagliflozin (Jardiance) 10 mg tablet Take 1 Tablet by mouth daily. 90 Tablet 3    Blood-Glucose Meter monitoring kit Up to 3 times daily 1 Kit 0    cetirizine (ZYRTEC) 10 mg tablet Take 1 Tab by mouth daily. Indications: inflammation of the nose due to an allergy 90 Tab 3    ascorbic acid, vitamin C, (Vitamin C) 500 mg tablet Take  by mouth.  albuterol (PROVENTIL HFA, VENTOLIN HFA, PROAIR HFA) 90 mcg/actuation inhaler Take 2 Puffs by inhalation every four (4) hours as needed for Wheezing. 1 Inhaler 3    acetaminophen (TYLENOL) 500 mg tablet Take 1 Tab by mouth every four (4) hours (while awake). Indications: Pain 100 Tab 0    rosuvastatin (CRESTOR) 40 mg tablet Take 1 Tablet by mouth nightly. 30 Tablet 5    metFORMIN (GLUCOPHAGE) 500 mg tablet Take 1 Tablet by mouth two (2) times daily (with meals). 180 Tablet 3    raNITIdine hcl 150 mg capsule Take 150 mg by mouth daily. (Patient not taking: Reported on 3/29/2022)      insulin aspart protamine/insulin aspart (NOVOLOG MIX 70/30) 100 unit/mL (70-30) inpn 15 Units by SubCUTAneous route ACB/HS.  10 Pen 3    losartan (COZAAR) 25 mg tablet Take 1 Tablet by mouth daily. 30 Tablet 5    omeprazole (PRILOSEC) 20 mg capsule Take 1 Capsule by mouth daily. 90 Capsule 3    ipratropium (ATROVENT) 42 mcg (0.06 %) nasal spray 1 [place] by Both Nostrils route four (4) times daily.  Indications: runny nose (Patient not taking: Reported on 3/29/2022) 15 mL 5       As scheduled

## 2022-03-31 NOTE — ED ADULT TRIAGE NOTE - IDEAL BODY WEIGHT(KG)
Requesting refill of Ritalin LA 30mg    Last Rx written: 2/16/22  Last Rx dispensed (per PDMP): 2/19/22    Last office visit: 1/18/22  Upcoming office visit: 4/07/22    Rx prepped, routed to provider for signature.           71

## 2022-04-05 NOTE — H&P PST ADULT - RESPIRATORY AND THORAX
Appointment scheduled for the date of 6-3-2022. Patient agreed to appointment date, time, and location. Location confirmed at the time of call.      details…

## 2022-11-23 PROBLEM — U07.1 COVID-19: Chronic | Status: ACTIVE | Noted: 2020-06-10

## 2022-12-21 PROBLEM — Z00.00 ENCOUNTER FOR PREVENTIVE HEALTH EXAMINATION: Status: ACTIVE | Noted: 2022-12-21

## 2022-12-22 ENCOUNTER — APPOINTMENT (OUTPATIENT)
Dept: UROLOGY | Facility: CLINIC | Age: 57
End: 2022-12-22

## 2022-12-22 VITALS
SYSTOLIC BLOOD PRESSURE: 141 MMHG | HEIGHT: 69 IN | WEIGHT: 170 LBS | BODY MASS INDEX: 25.18 KG/M2 | DIASTOLIC BLOOD PRESSURE: 88 MMHG | HEART RATE: 57 BPM | OXYGEN SATURATION: 98 %

## 2022-12-22 DIAGNOSIS — R31.29 OTHER MICROSCOPIC HEMATURIA: ICD-10-CM

## 2022-12-22 PROCEDURE — 99204 OFFICE O/P NEW MOD 45 MIN: CPT

## 2022-12-23 LAB
APPEARANCE: CLEAR
BACTERIA: NEGATIVE
BILIRUBIN URINE: NEGATIVE
BLOOD URINE: NEGATIVE
COLOR: YELLOW
GLUCOSE QUALITATIVE U: NEGATIVE
HYALINE CASTS: 2 /LPF
KETONES URINE: NEGATIVE
LEUKOCYTE ESTERASE URINE: NEGATIVE
MICROSCOPIC-UA: NORMAL
NITRITE URINE: NEGATIVE
PH URINE: 6
PROTEIN URINE: ABNORMAL
RED BLOOD CELLS URINE: 3 /HPF
SPECIFIC GRAVITY URINE: 1.03
SQUAMOUS EPITHELIAL CELLS: 1 /HPF
UROBILINOGEN URINE: NORMAL
WHITE BLOOD CELLS URINE: 2 /HPF

## 2022-12-27 ENCOUNTER — APPOINTMENT (OUTPATIENT)
Dept: CT IMAGING | Facility: CLINIC | Age: 57
End: 2022-12-27
Payer: COMMERCIAL

## 2022-12-27 ENCOUNTER — OUTPATIENT (OUTPATIENT)
Dept: OUTPATIENT SERVICES | Facility: HOSPITAL | Age: 57
LOS: 1 days | End: 2022-12-27
Payer: COMMERCIAL

## 2022-12-27 DIAGNOSIS — Z00.8 ENCOUNTER FOR OTHER GENERAL EXAMINATION: ICD-10-CM

## 2022-12-27 DIAGNOSIS — R31.29 OTHER MICROSCOPIC HEMATURIA: ICD-10-CM

## 2022-12-27 LAB — BACTERIA UR CULT: NORMAL

## 2022-12-27 PROCEDURE — 74178 CT ABD&PLV WO CNTR FLWD CNTR: CPT

## 2022-12-27 PROCEDURE — 74178 CT ABD&PLV WO CNTR FLWD CNTR: CPT | Mod: 26

## 2022-12-27 NOTE — ASSESSMENT
[FreeTextEntry1] : 57 year old man with hematuria. We discussed that the differential diagnosis includes both benign and malignant conditions including renal stones, BPH, urinary tract infections, and cancer of the bladder or ureter or kidney. Cystoscopy was recommended to rule out pathology in the bladder. CT Urogram was recommended to evaluate for presence of nephroureteral stones or malignancies. Urinalysis and urine culture were recommended to check for urinary tract infection. Pt agrees and understands.

## 2022-12-27 NOTE — PHYSICAL EXAM
[General Appearance - Well Developed] : well developed [General Appearance - Well Nourished] : well nourished [Normal Appearance] : normal appearance [Well Groomed] : well groomed [General Appearance - In No Acute Distress] : no acute distress [Edema] : no peripheral edema [Respiration, Rhythm And Depth] : normal respiratory rhythm and effort [Exaggerated Use Of Accessory Muscles For Inspiration] : no accessory muscle use [Abdomen Soft] : soft [Abdomen Tenderness] : non-tender [Costovertebral Angle Tenderness] : no ~M costovertebral angle tenderness [Urethral Meatus] : meatus normal [Prostate Tenderness] : the prostate was not tender [No Prostate Nodules] : no prostate nodules [Prostate Size ___ gm] : prostate size [unfilled] gm [Normal Station and Gait] : the gait and station were normal for the patient's age [] : no rash [No Focal Deficits] : no focal deficits [Oriented To Time, Place, And Person] : oriented to person, place, and time [Affect] : the affect was normal [Mood] : the mood was normal [Not Anxious] : not anxious [No Palpable Adenopathy] : no palpable adenopathy [FreeTextEntry1] : SOme PFM firmness noted.

## 2022-12-27 NOTE — HISTORY OF PRESENT ILLNESS
[FreeTextEntry1] : 57 year old man with complaint of gross hematuria x 1 episode about a month ago. Patient reports he had some urinary symptoms like urinry frequency and urgency for which he saw his PCP couple of months ago and microscopic hematuria was found, he was given abx and Flomax but did not take the flomax. He reports a month after that he had an episode of painless gross hematuria which resolved spontaneously. He continues to note some urinary frequency and urgency. PSA WNL. \par It is associated with nothing.\par No current gross hematuria, no dysuria, no hesitancy, no straining. No incontinence. \par No fevers, no chills, no nausea, no vomiting, no flank pain. \par Family hx of prostate ca- father. \par \par \par

## 2023-01-05 ENCOUNTER — NON-APPOINTMENT (OUTPATIENT)
Age: 58
End: 2023-01-05

## 2023-01-06 ENCOUNTER — NON-APPOINTMENT (OUTPATIENT)
Age: 58
End: 2023-01-06

## 2023-02-28 ENCOUNTER — APPOINTMENT (OUTPATIENT)
Dept: UROLOGY | Facility: CLINIC | Age: 58
End: 2023-02-28
Payer: COMMERCIAL

## 2023-02-28 VITALS
SYSTOLIC BLOOD PRESSURE: 152 MMHG | DIASTOLIC BLOOD PRESSURE: 96 MMHG | OXYGEN SATURATION: 97 % | RESPIRATION RATE: 16 BRPM | HEART RATE: 80 BPM

## 2023-02-28 DIAGNOSIS — N32.9 BLADDER DISORDER, UNSPECIFIED: ICD-10-CM

## 2023-02-28 DIAGNOSIS — R31.0 GROSS HEMATURIA: ICD-10-CM

## 2023-02-28 PROCEDURE — 52000 CYSTOURETHROSCOPY: CPT

## 2023-03-21 ENCOUNTER — RESULT REVIEW (OUTPATIENT)
Age: 58
End: 2023-03-21

## 2023-03-21 ENCOUNTER — OUTPATIENT (OUTPATIENT)
Dept: OUTPATIENT SERVICES | Facility: HOSPITAL | Age: 58
LOS: 1 days | End: 2023-03-21
Payer: COMMERCIAL

## 2023-03-21 VITALS
DIASTOLIC BLOOD PRESSURE: 88 MMHG | HEART RATE: 62 BPM | HEIGHT: 68 IN | TEMPERATURE: 98 F | SYSTOLIC BLOOD PRESSURE: 132 MMHG | WEIGHT: 167.99 LBS | OXYGEN SATURATION: 100 % | RESPIRATION RATE: 16 BRPM

## 2023-03-21 DIAGNOSIS — Z96.662 PRESENCE OF LEFT ARTIFICIAL ANKLE JOINT: Chronic | ICD-10-CM

## 2023-03-21 DIAGNOSIS — Z01.818 ENCOUNTER FOR OTHER PREPROCEDURAL EXAMINATION: ICD-10-CM

## 2023-03-21 LAB
ABO RH CONFIRMATION: SIGNIFICANT CHANGE UP
ANION GAP SERPL CALC-SCNC: 3 MMOL/L — LOW (ref 5–17)
APPEARANCE UR: CLEAR — SIGNIFICANT CHANGE UP
APTT BLD: 35.5 SEC — SIGNIFICANT CHANGE UP (ref 27.5–35.5)
BACTERIA # UR AUTO: ABNORMAL
BASOPHILS # BLD AUTO: 0.04 K/UL — SIGNIFICANT CHANGE UP (ref 0–0.2)
BASOPHILS NFR BLD AUTO: 0.6 % — SIGNIFICANT CHANGE UP (ref 0–2)
BILIRUB UR-MCNC: NEGATIVE — SIGNIFICANT CHANGE UP
BLD GP AB SCN SERPL QL: SIGNIFICANT CHANGE UP
BUN SERPL-MCNC: 25 MG/DL — HIGH (ref 7–23)
CALCIUM SERPL-MCNC: 9.2 MG/DL — SIGNIFICANT CHANGE UP (ref 8.5–10.1)
CHLORIDE SERPL-SCNC: 110 MMOL/L — HIGH (ref 96–108)
CO2 SERPL-SCNC: 27 MMOL/L — SIGNIFICANT CHANGE UP (ref 22–31)
COLOR SPEC: YELLOW — SIGNIFICANT CHANGE UP
CREAT SERPL-MCNC: 1.01 MG/DL — SIGNIFICANT CHANGE UP (ref 0.5–1.3)
DIFF PNL FLD: ABNORMAL
EGFR: 86 ML/MIN/1.73M2 — SIGNIFICANT CHANGE UP
EOSINOPHIL # BLD AUTO: 0.15 K/UL — SIGNIFICANT CHANGE UP (ref 0–0.5)
EOSINOPHIL NFR BLD AUTO: 2.1 % — SIGNIFICANT CHANGE UP (ref 0–6)
EPI CELLS # UR: NEGATIVE — SIGNIFICANT CHANGE UP
GLUCOSE SERPL-MCNC: 99 MG/DL — SIGNIFICANT CHANGE UP (ref 70–99)
GLUCOSE UR QL: NEGATIVE — SIGNIFICANT CHANGE UP
HCT VFR BLD CALC: 39.6 % — SIGNIFICANT CHANGE UP (ref 39–50)
HGB BLD-MCNC: 13.1 G/DL — SIGNIFICANT CHANGE UP (ref 13–17)
IMM GRANULOCYTES NFR BLD AUTO: 0.3 % — SIGNIFICANT CHANGE UP (ref 0–0.9)
INR BLD: 0.99 RATIO — SIGNIFICANT CHANGE UP (ref 0.88–1.16)
KETONES UR-MCNC: ABNORMAL
LEUKOCYTE ESTERASE UR-ACNC: NEGATIVE — SIGNIFICANT CHANGE UP
LYMPHOCYTES # BLD AUTO: 1.1 K/UL — SIGNIFICANT CHANGE UP (ref 1–3.3)
LYMPHOCYTES # BLD AUTO: 15.2 % — SIGNIFICANT CHANGE UP (ref 13–44)
MCHC RBC-ENTMCNC: 31 PG — SIGNIFICANT CHANGE UP (ref 27–34)
MCHC RBC-ENTMCNC: 33.1 GM/DL — SIGNIFICANT CHANGE UP (ref 32–36)
MCV RBC AUTO: 93.8 FL — SIGNIFICANT CHANGE UP (ref 80–100)
MONOCYTES # BLD AUTO: 0.33 K/UL — SIGNIFICANT CHANGE UP (ref 0–0.9)
MONOCYTES NFR BLD AUTO: 4.6 % — SIGNIFICANT CHANGE UP (ref 2–14)
NEUTROPHILS # BLD AUTO: 5.58 K/UL — SIGNIFICANT CHANGE UP (ref 1.8–7.4)
NEUTROPHILS NFR BLD AUTO: 77.2 % — HIGH (ref 43–77)
NITRITE UR-MCNC: NEGATIVE — SIGNIFICANT CHANGE UP
PH UR: 5 — SIGNIFICANT CHANGE UP (ref 5–8)
PLATELET # BLD AUTO: 402 K/UL — HIGH (ref 150–400)
POTASSIUM SERPL-MCNC: 4.1 MMOL/L — SIGNIFICANT CHANGE UP (ref 3.5–5.3)
POTASSIUM SERPL-SCNC: 4.1 MMOL/L — SIGNIFICANT CHANGE UP (ref 3.5–5.3)
PROT UR-MCNC: NEGATIVE — SIGNIFICANT CHANGE UP
PROTHROM AB SERPL-ACNC: 11.5 SEC — SIGNIFICANT CHANGE UP (ref 10.5–13.4)
RBC # BLD: 4.22 M/UL — SIGNIFICANT CHANGE UP (ref 4.2–5.8)
RBC # FLD: 12.8 % — SIGNIFICANT CHANGE UP (ref 10.3–14.5)
RBC CASTS # UR COMP ASSIST: SIGNIFICANT CHANGE UP /HPF (ref 0–4)
SODIUM SERPL-SCNC: 140 MMOL/L — SIGNIFICANT CHANGE UP (ref 135–145)
SP GR SPEC: 1.02 — SIGNIFICANT CHANGE UP (ref 1.01–1.02)
UROBILINOGEN FLD QL: NEGATIVE — SIGNIFICANT CHANGE UP
WBC # BLD: 7.22 K/UL — SIGNIFICANT CHANGE UP (ref 3.8–10.5)
WBC # FLD AUTO: 7.22 K/UL — SIGNIFICANT CHANGE UP (ref 3.8–10.5)
WBC UR QL: ABNORMAL /HPF (ref 0–5)

## 2023-03-21 PROCEDURE — 85025 COMPLETE CBC W/AUTO DIFF WBC: CPT

## 2023-03-21 PROCEDURE — 87086 URINE CULTURE/COLONY COUNT: CPT

## 2023-03-21 PROCEDURE — 71046 X-RAY EXAM CHEST 2 VIEWS: CPT

## 2023-03-21 PROCEDURE — 86901 BLOOD TYPING SEROLOGIC RH(D): CPT

## 2023-03-21 PROCEDURE — 36415 COLL VENOUS BLD VENIPUNCTURE: CPT

## 2023-03-21 PROCEDURE — 86900 BLOOD TYPING SEROLOGIC ABO: CPT

## 2023-03-21 PROCEDURE — 86850 RBC ANTIBODY SCREEN: CPT

## 2023-03-21 PROCEDURE — 93005 ELECTROCARDIOGRAM TRACING: CPT

## 2023-03-21 PROCEDURE — 71046 X-RAY EXAM CHEST 2 VIEWS: CPT | Mod: 26

## 2023-03-21 PROCEDURE — 80048 BASIC METABOLIC PNL TOTAL CA: CPT

## 2023-03-21 PROCEDURE — 85730 THROMBOPLASTIN TIME PARTIAL: CPT

## 2023-03-21 PROCEDURE — 93010 ELECTROCARDIOGRAM REPORT: CPT

## 2023-03-21 PROCEDURE — 99214 OFFICE O/P EST MOD 30 MIN: CPT | Mod: 25

## 2023-03-21 PROCEDURE — 85610 PROTHROMBIN TIME: CPT

## 2023-03-21 PROCEDURE — 81001 URINALYSIS AUTO W/SCOPE: CPT

## 2023-03-21 NOTE — H&P PST ADULT - ASSESSMENT
57 years old male present to PST prior to transurethral resection of bladder tumor with Dr. Ulrich.    Plan   1. Education and Instructions given to patient regarding upcoming surgery  2. NPO as per ASU  3. Drink a quart of extra  fluids the day before your surgery.  4. Medical optimization for surgery with Dr. Gee  8. CBC, BMP, PT/ INR and PTT, Urinalysis, Urine Culture, Type and Screen  done in PST and sent to lab  9. EKG and Chest x- ray done in PST

## 2023-03-21 NOTE — H&P PST ADULT - NSICDXFAMILYHX_GEN_ALL_CORE_FT
FAMILY HISTORY:  Father  Still living? No  Family history of skin cancer, Age at diagnosis: Age Unknown  FHx: arthritis, Age at diagnosis: Age Unknown    Mother  Still living? Yes, Estimated age: 81-90  Family history of heart disease, Age at diagnosis: Age Unknown  FHx: arthritis, Age at diagnosis: Age Unknown

## 2023-03-21 NOTE — H&P PST ADULT - HISTORY OF PRESENT ILLNESS
57 years old male with bladder lesion. Seen by Dr. Gee for urinary frequency and pressure. Hematuria noted from urine sample. He was placed on Antibiotics. He had kranthi blood "days later". he made an appointment with Dr. Ulrich. MRI and cystoscopy done. Frequent urination. Denies kranthi hematuria. Planned TURBT with installation of chemotherapy.

## 2023-03-21 NOTE — H&P PST ADULT - MUSCULOSKELETAL COMMENTS
left ankle replaced 2/ 10/ 2023. Healing incision uses a cane for ambulation. Swelling to left ankle

## 2023-03-21 NOTE — H&P PST ADULT - NSICDXPASTMEDICALHX_GEN_ALL_CORE_FT
PAST MEDICAL HISTORY:  COVID-19 virus infection March2020 , admitted to [facility] 7 days    History of hematuria     Lesion of urinary bladder     Osteoarthritis of left ankle

## 2023-03-22 DIAGNOSIS — Z01.818 ENCOUNTER FOR OTHER PREPROCEDURAL EXAMINATION: ICD-10-CM

## 2023-03-22 LAB
CULTURE RESULTS: SIGNIFICANT CHANGE UP
SPECIMEN SOURCE: SIGNIFICANT CHANGE UP

## 2023-03-31 ENCOUNTER — APPOINTMENT (OUTPATIENT)
Dept: UROLOGY | Facility: HOSPITAL | Age: 58
End: 2023-03-31

## 2023-03-31 ENCOUNTER — TRANSCRIPTION ENCOUNTER (OUTPATIENT)
Age: 58
End: 2023-03-31

## 2023-03-31 ENCOUNTER — OUTPATIENT (OUTPATIENT)
Dept: INPATIENT UNIT | Facility: HOSPITAL | Age: 58
LOS: 1 days | Discharge: HOME | End: 2023-03-31
Payer: COMMERCIAL

## 2023-03-31 ENCOUNTER — RESULT REVIEW (OUTPATIENT)
Age: 58
End: 2023-03-31

## 2023-03-31 VITALS
DIASTOLIC BLOOD PRESSURE: 91 MMHG | WEIGHT: 167.99 LBS | OXYGEN SATURATION: 98 % | RESPIRATION RATE: 14 BRPM | SYSTOLIC BLOOD PRESSURE: 124 MMHG | HEIGHT: 68 IN | TEMPERATURE: 98 F | HEART RATE: 65 BPM

## 2023-03-31 VITALS
OXYGEN SATURATION: 100 % | HEART RATE: 59 BPM | SYSTOLIC BLOOD PRESSURE: 115 MMHG | RESPIRATION RATE: 18 BRPM | TEMPERATURE: 97 F | DIASTOLIC BLOOD PRESSURE: 71 MMHG

## 2023-03-31 DIAGNOSIS — N32.9 BLADDER DISORDER, UNSPECIFIED: ICD-10-CM

## 2023-03-31 DIAGNOSIS — R52 PAIN, UNSPECIFIED: ICD-10-CM

## 2023-03-31 DIAGNOSIS — Z96.662 PRESENCE OF LEFT ARTIFICIAL ANKLE JOINT: Chronic | ICD-10-CM

## 2023-03-31 PROCEDURE — 88307 TISSUE EXAM BY PATHOLOGIST: CPT | Mod: 26

## 2023-03-31 PROCEDURE — C9399: CPT

## 2023-03-31 PROCEDURE — 88307 TISSUE EXAM BY PATHOLOGIST: CPT

## 2023-03-31 PROCEDURE — 52235 CYSTOSCOPY AND TREATMENT: CPT

## 2023-03-31 RX ORDER — MITOMYCIN 5 MG/10ML
40 INJECTION, POWDER, LYOPHILIZED, FOR SOLUTION INTRAVENOUS ONCE
Refills: 0 | Status: DISCONTINUED | OUTPATIENT
Start: 2023-03-31 | End: 2023-03-31

## 2023-03-31 RX ORDER — PHENAZOPYRIDINE HCL 100 MG
200 TABLET ORAL ONCE
Refills: 0 | Status: COMPLETED | OUTPATIENT
Start: 2023-03-31 | End: 2023-03-31

## 2023-03-31 RX ORDER — TRAMADOL HYDROCHLORIDE 50 MG/1
1 TABLET ORAL
Qty: 9 | Refills: 0
Start: 2023-03-31 | End: 2023-04-02

## 2023-03-31 RX ORDER — OXYCODONE HYDROCHLORIDE 5 MG/1
5 TABLET ORAL ONCE
Refills: 0 | Status: DISCONTINUED | OUTPATIENT
Start: 2023-03-31 | End: 2023-03-31

## 2023-03-31 RX ORDER — FENTANYL CITRATE 50 UG/ML
50 INJECTION INTRAVENOUS
Refills: 0 | Status: DISCONTINUED | OUTPATIENT
Start: 2023-03-31 | End: 2023-03-31

## 2023-03-31 RX ORDER — SODIUM CHLORIDE 9 MG/ML
1000 INJECTION, SOLUTION INTRAVENOUS
Refills: 0 | Status: DISCONTINUED | OUTPATIENT
Start: 2023-03-31 | End: 2023-03-31

## 2023-03-31 RX ORDER — OXYBUTYNIN CHLORIDE 5 MG
5 TABLET ORAL ONCE
Refills: 0 | Status: COMPLETED | OUTPATIENT
Start: 2023-03-31 | End: 2023-03-31

## 2023-03-31 RX ORDER — CEFUROXIME AXETIL 250 MG
1 TABLET ORAL
Qty: 6 | Refills: 0
Start: 2023-03-31 | End: 2023-04-02

## 2023-03-31 RX ORDER — OXYBUTYNIN CHLORIDE 5 MG
1 TABLET ORAL
Qty: 15 | Refills: 0
Start: 2023-03-31 | End: 2023-04-04

## 2023-03-31 RX ORDER — ONDANSETRON 8 MG/1
4 TABLET, FILM COATED ORAL ONCE
Refills: 0 | Status: DISCONTINUED | OUTPATIENT
Start: 2023-03-31 | End: 2023-03-31

## 2023-03-31 RX ORDER — PHENAZOPYRIDINE HCL 100 MG
1 TABLET ORAL
Qty: 9 | Refills: 0
Start: 2023-03-31 | End: 2023-04-02

## 2023-03-31 RX ADMIN — Medication 5 MILLIGRAM(S): at 16:49

## 2023-03-31 RX ADMIN — Medication 200 MILLIGRAM(S): at 16:49

## 2023-03-31 NOTE — ASU PATIENT PROFILE, ADULT - FALL HARM RISK - UNIVERSAL INTERVENTIONS
Bed in lowest position, wheels locked, appropriate side rails in place/Call bell, personal items and telephone in reach/Instruct patient to call for assistance before getting out of bed or chair/Non-slip footwear when patient is out of bed/[place] to call system/Physically safe environment - no spills, clutter or unnecessary equipment/Purposeful Proactive Rounding/Room/bathroom lighting operational, light cord in reach

## 2023-03-31 NOTE — ASU DISCHARGE PLAN (ADULT/PEDIATRIC) - NS MD DC FALL RISK RISK
For information on Fall & Injury Prevention, visit: [email or web address] OR  [email or web address] OR  https://www.cdc.gov/steadi/patient.html

## 2023-03-31 NOTE — ASU PATIENT PROFILE, ADULT - NSICDXPASTMEDICALHX_GEN_ALL_CORE_FT
PAST MEDICAL HISTORY:  COVID-19 virus infection March2020 , admitted to [facility] 7 days    History of hematuria     Lesion of urinary bladder     Osteoarthritis of left ankle

## 2023-03-31 NOTE — BRIEF OPERATIVE NOTE - OPERATION/FINDINGS
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

## 2023-03-31 NOTE — BRIEF OPERATIVE NOTE - NSICDXBRIEFPROCEDURE_GEN_ALL_CORE_FT
PROCEDURES:  Cystourethroscopy with bladder tumor resection, medium 31-Mar-2023 16:32:53  Alvino Ulrich  Chemotherapy instillation of urinary bladder 31-Mar-2023 16:33:12  Alvino Ulrich

## 2023-03-31 NOTE — ASU PREOP CHECKLIST - BSA (M2)
[facility]    Facility:   [facility] [[id number]]   Code Status: DNR      CHIEF COMPLAINT/REASON FOR VISIT:  Chief Complaint   Patient presents with     Follow Up     rehab, ankles       HISTORY:      HPI: Kat is a 83 y.o. female who is still in the transitional care unit secondary to her hospitalization December 29, 2019 through January 1, 2020 secondary to syncope and acute bilateral ankle fractures.  She did have a chance to see orthopedics on the 15th and next visit is on the 29th.  Pretty much bedbound and no ambulation or nonweightbearing at this time.  She does have a history of chronic pain which is not an issue at this time.  Had a chance to visit with her  and her daughter.  Her daughter did come in to have a conversation today.  They do live in the assisted living facility on this campus.  Eventually the plan is for all of them to be able to go to the daughter's home and live there with basic services.  Looking at her blood pressures systolically they have all been less than 107 so we will discontinue the Midrin 5 mg 3 times daily we been doing a dose reduction on that medication.  She is on Aricept and gabapentin.  No bowel issues.  Appetite good.  Does seem to be in good spirits today.  In talking about her goals and plan with the daughter and the  today they will figure out more information once they see the surgeon on the 29th and then at that point when she gets some weightbearing status they like to bring her to the house and have home therapy.  Otherwise there were no other questions or concerns.    Past Medical History:   Diagnosis Date     Dementia (H)      Hypotension      Neuropathy              No family history on file.  Social History     Socioeconomic History     Marital status:      Spouse name: Not on file     Number of children: Not on file     Years of education: Not on file     Highest education level: Not on file    Occupational History     Not on file   Social Needs     Financial resource strain: Not on file     Food insecurity:     Worry: Not on file     Inability: Not on file     Transportation needs:     Medical: Not on file     Non-medical: Not on file   Tobacco Use     Smoking status: Never Smoker     Smokeless tobacco: Never Used   Substance and Sexual Activity     Alcohol use: Not Currently     Drug use: Never     Sexual activity: Not Currently   Lifestyle     Physical activity:     Days per week: Not on file     Minutes per session: Not on file     Stress: Not on file   Relationships     Social connections:     Talks on phone: Not on file     Gets together: Not on file     Attends [employer] service: Not on file     Active member of club or organization: Not on file     Attends meetings of clubs or organizations: Not on file     Relationship status: Not on file     Intimate partner violence:     Fear of current or ex partner: Not on file     Emotionally abused: Not on file     Physically abused: Not on file     Forced sexual activity: Not on file   Other Topics Concern     Not on file   Social History Narrative     Not on file         Review of Systems  Currently she denies chills and fever coughing wheezing chest pain dizziness or vertigo nausea vomiting diarrhea dysuria flulike symptoms headache or stiff neck.  History of cognitive impairment hyperlipidemia and syncopal episodes and most recently bilateral ankle fracture secondary to a fall.  Neuropathic pain.    Current Outpatient Medications:      acetaminophen (TYLENOL) 500 MG tablet, Take 1 tablet (500 mg total) by mouth every 6 (six) hours as needed for pain or fever., Disp: , Rfl: 0     aspirin 81 mg chewable tablet, Chew 1 tablet (81 mg total) 2 (two) times a day., Disp: , Rfl: 0     bisacodyl (DULCOLAX) 10 mg suppository, Insert suppository rectally daily as needed for treatment of constipation., Disp: , Rfl: 0     donepezil (ARICEPT) 10 MG tablet, Take 10  mg by mouth at bedtime., Disp: , Rfl:      gabapentin (NEURONTIN) 600 MG tablet, Take 700 mg by mouth 2 (two) times a day. , Disp: , Rfl:      magnesium hydroxide (MILK OF MAG) 400 mg/5 mL Susp suspension, Take 30 mL by mouth daily as needed., Disp: , Rfl: 0     polyethylene glycol (MIRALAX) 17 gram packet, Take 1 packet (17 g total) by mouth daily as needed., Disp: , Rfl: 0     polyvinyl alcohol (LIQUIFILM TEARS) 1.4 % ophthalmic solution, Apply 1 drop to eye as needed for dry eyes., Disp: , Rfl:      rosuvastatin (CRESTOR) 10 MG tablet, Take 10 mg by mouth daily. , Disp: , Rfl:     There were no vitals filed for this visit.  Blood pressure 98/63 pulse 65 respirations 14 temperature 97.2  Physical Exam  Head is normocephalic.    Neck is supple without adenopathy.  Lungs are clear throughout.  Cardiovascular is normal without murmurs.  No lower extremity edema.  Gastrointestinal nondistended.  Musculoskeletal moves upper extremities.  Bilateral lower extremities in Cam boots.  Positive CMS to her feet as well as popliteal pulses.  Psychiatric Pleasant affect.  Does have cognitive impairment.          LABS:   Lab Results   Component Value Date    WBC 7.5 12/30/2019    HGB 11.3 (L) 12/30/2019    HCT 39.1 12/29/2019    MCV 93 12/29/2019     12/29/2019         ASSESSMENT:      ICD-10-CM    1. Hypotension, unspecified hypotension type I95.9    2. Closed fracture of both ankles with routine healing, subsequent encounter S82.891D     S82.892D    3. Cognitive impairment R41.89    4. Chronic bilateral low back pain without sciatica M54.5     G89.29        PLAN:    Her exam is unremarkable.  No pain issues on gabapentin.  I will discontinue the Midrin due to systolic blood pressures less than 107 over the past week plus.  Had a chance to speak with her  and daughter about the care as well as her ankles and the follow-up again with orthopedics later this month.  They agree with the current plan of care.  Did not  have any other questions.    For documentation purposes total visit 35 minutes which over 50% was spent with the patient and family going over her care her medications blood pressures her stay on the transitional care unit and coordination of care.    Electronically signed by: Michael Duane Johnson, CNP   1.9

## 2023-03-31 NOTE — ASU DISCHARGE PLAN (ADULT/PEDIATRIC) - CARE PROVIDER_API CALL
Alvino Ulrich)  Urology  [address], [address] [place] [address]  Phone: [phone]  Fax: [phone]  Follow Up Time: 1 week

## 2023-04-04 ENCOUNTER — TRANSCRIPTION ENCOUNTER (OUTPATIENT)
Age: 58
End: 2023-04-04

## 2023-04-05 ENCOUNTER — EMERGENCY (EMERGENCY)
Facility: HOSPITAL | Age: 58
LOS: 0 days | Discharge: HOME | End: 2023-04-05
Attending: EMERGENCY MEDICINE
Payer: COMMERCIAL

## 2023-04-05 VITALS
OXYGEN SATURATION: 99 % | TEMPERATURE: 98 F | RESPIRATION RATE: 18 BRPM | SYSTOLIC BLOOD PRESSURE: 148 MMHG | HEART RATE: 58 BPM | DIASTOLIC BLOOD PRESSURE: 95 MMHG

## 2023-04-05 VITALS
RESPIRATION RATE: 18 BRPM | DIASTOLIC BLOOD PRESSURE: 98 MMHG | OXYGEN SATURATION: 99 % | HEART RATE: 64 BPM | TEMPERATURE: 98 F | SYSTOLIC BLOOD PRESSURE: 140 MMHG

## 2023-04-05 DIAGNOSIS — N39.0 URINARY TRACT INFECTION, SITE NOT SPECIFIED: ICD-10-CM

## 2023-04-05 DIAGNOSIS — Z96.662 PRESENCE OF LEFT ARTIFICIAL ANKLE JOINT: ICD-10-CM

## 2023-04-05 DIAGNOSIS — Z86.16 PERSONAL HISTORY OF COVID-19: ICD-10-CM

## 2023-04-05 DIAGNOSIS — M19.072 PRIMARY OSTEOARTHRITIS, LEFT ANKLE AND FOOT: ICD-10-CM

## 2023-04-05 DIAGNOSIS — R35.0 FREQUENCY OF MICTURITION: ICD-10-CM

## 2023-04-05 DIAGNOSIS — R10.30 LOWER ABDOMINAL PAIN, UNSPECIFIED: ICD-10-CM

## 2023-04-05 DIAGNOSIS — Z96.662 PRESENCE OF LEFT ARTIFICIAL ANKLE JOINT: Chronic | ICD-10-CM

## 2023-04-05 PROBLEM — N32.9 BLADDER DISORDER, UNSPECIFIED: Chronic | Status: ACTIVE | Noted: 2023-03-21

## 2023-04-05 PROBLEM — Z87.448 PERSONAL HISTORY OF OTHER DISEASES OF URINARY SYSTEM: Chronic | Status: ACTIVE | Noted: 2023-03-21

## 2023-04-05 LAB
APPEARANCE UR: ABNORMAL
BACTERIA # UR AUTO: ABNORMAL
BILIRUB UR-MCNC: NEGATIVE — SIGNIFICANT CHANGE UP
COLOR SPEC: ABNORMAL
DIFF PNL FLD: ABNORMAL
EPI CELLS # UR: NEGATIVE — SIGNIFICANT CHANGE UP
GLUCOSE UR QL: NEGATIVE — SIGNIFICANT CHANGE UP
KETONES UR-MCNC: ABNORMAL
LEUKOCYTE ESTERASE UR-ACNC: ABNORMAL
NITRITE UR-MCNC: NEGATIVE — SIGNIFICANT CHANGE UP
PH UR: 5 — SIGNIFICANT CHANGE UP (ref 5–8)
PROT UR-MCNC: 500 MG/DL
RBC CASTS # UR COMP ASSIST: >50 /HPF (ref 0–4)
SP GR SPEC: 1.02 — SIGNIFICANT CHANGE UP (ref 1.01–1.02)
SURGICAL PATHOLOGY STUDY: SIGNIFICANT CHANGE UP
UROBILINOGEN FLD QL: NEGATIVE — SIGNIFICANT CHANGE UP
WBC UR QL: ABNORMAL /HPF (ref 0–5)

## 2023-04-05 PROCEDURE — 76775 US EXAM ABDO BACK WALL LIM: CPT | Mod: 26

## 2023-04-05 PROCEDURE — 99284 EMERGENCY DEPT VISIT MOD MDM: CPT | Mod: 25

## 2023-04-05 PROCEDURE — 81001 URINALYSIS AUTO W/SCOPE: CPT

## 2023-04-05 PROCEDURE — 87086 URINE CULTURE/COLONY COUNT: CPT

## 2023-04-05 PROCEDURE — 76775 US EXAM ABDO BACK WALL LIM: CPT

## 2023-04-05 RX ADMIN — Medication 1 TABLET(S): at 08:28

## 2023-04-05 NOTE — ED ADULT NURSE NOTE - OBJECTIVE STATEMENT
pt presents to the ED 1 week post bladder surgery with Serg. pt reports bladder discomfort, urgency to urinate, blood in urine with 'chunks of lining'. pt told by surgeon this was to be expected for a few days however pt has presented with worsening symptoms and feels little to no relief with medication prescribed by surgeon. pt states he is urinating 'every 20 minuntes' even when when he does not produce a lot of urine. pt reports no further complaints at this time. resting comfortably in bed - able to urinate in cup.

## 2023-04-05 NOTE — ED PROVIDER NOTE - OBJECTIVE STATEMENT
57-year-old male  recent cystoscopy for removal of bladder mass with localized chemotherapy by Dr. Ulrich 5 days ago presents the emergency department for urinary symptoms.  Patient states that he is having urinary frequency urgency he is seeing some blood in his urine as well as passing some clots.  He is unsure if he is  retaining urine.  No fevers no back pain no nausea or vomiting does have some lower abdominal cramping.

## 2023-04-05 NOTE — ED PROVIDER NOTE - PHYSICAL EXAMINATION
Constitutional: NAD AAOx3  Eyes: PERRLA EOMI  Head: Normocephalic atraumatic  Mouth: MMM  Cardiac: regular rate   Resp: Lungs CTAB  GI: Abd s/nt/nd no cvat  Neuro: CN2-12 intact  Skin: No visible rashes

## 2023-04-05 NOTE — ED ADULT NURSE NOTE - NSICDXPASTMEDICALHX_GEN_ALL_CORE_FT
Document placed at .   PAST MEDICAL HISTORY:  COVID-19 virus infection March2020 , admitted to [facility] 7 days    History of hematuria     Lesion of urinary bladder     Osteoarthritis of left ankle

## 2023-04-05 NOTE — ED PROVIDER NOTE - NSFOLLOWUPINSTRUCTIONS_ED_ALL_ED_FT
1. return for worsening symptoms or anything concerning to you  2. take all home meds as prescribed  3. follow up with your pmd call to make an appointment  4. take bactrim as directed    Urinary Tract Infection, Adult  ImageA urinary tract infection (UTI) is an infection of any part of the urinary tract, which includes the kidneys, ureters, bladder, and urethra. These organs make, store, and get rid of urine in the body. UTI can be a bladder infection (cystitis) or kidney infection (pyelonephritis).    What are the causes?  This infection may be caused by fungi, viruses, or bacteria. Bacteria are the most common cause of UTIs. This condition can also be caused by repeated incomplete emptying of the bladder during urination.    What increases the risk?  This condition is more likely to develop if:    You ignore your need to urinate or hold urine for long periods of time.  You do not empty your bladder completely during urination.  You wipe back to front after urinating or having a bowel movement, if you are female.  You are uncircumcised, if you are male.  You are constipated.  You have a urinary catheter that stays in place (indwelling).  You have a weak defense (immune) system.  You have a medical condition that affects your bowels, kidneys, or bladder.  You have diabetes.  You take antibiotic medicines frequently or for long periods of time, and the antibiotics no longer work well against certain types of infections (antibiotic resistance).  You take medicines that irritate your urinary tract.  You are exposed to chemicals that irritate your urinary tract.  You are female.    What are the signs or symptoms?  Symptoms of this condition include:    Fever.  Frequent urination or passing small amounts of urine frequently.  Needing to urinate urgently.  Pain or burning with urination.  Urine that smells bad or unusual.  Cloudy urine.  Pain in the lower abdomen or back.  Trouble urinating.  Blood in the urine.  Vomiting or being less hungry than normal.  Diarrhea or abdominal pain.  Vaginal discharge, if you are female.    How is this diagnosed?  This condition is diagnosed with a medical history and physical exam. You will also need to provide a urine sample to test your urine. Other tests may be done, including:    Blood tests.  Sexually transmitted disease (STD) testing.    If you have had more than one UTI, a cystoscopy or imaging studies may be done to determine the cause of the infections.    How is this treated?  Treatment for this condition often includes a combination of two or more of the following:    Antibiotic medicine.  Other medicines to treat less common causes of UTI.  Over-the-counter medicines to treat pain.  Drinking enough water to stay hydrated.    Follow these instructions at home:  Take over-the-counter and prescription medicines only as told by your health care provider.  If you were prescribed an antibiotic, take it as told by your health care provider. Do not stop taking the antibiotic even if you start to feel better.  Avoid alcohol, caffeine, tea, and carbonated beverages. They can irritate your bladder.  Drink enough fluid to keep your urine clear or pale yellow.  Keep all follow-up visits as told by your health care provider. This is important.  ImageMake sure to:    Empty your bladder often and completely. Do not hold urine for long periods of time.  Empty your bladder before and after sex.  Wipe from front to back after a bowel movement if you are female. Use each tissue one time when you wipe.    Contact a health care provider if:  You have back pain.  You have a fever.  You feel nauseous or vomit.  Your symptoms do not get better after 3 days.  Your symptoms go away and then return.  Get help right away if:  You have severe back pain or lower abdominal pain.  You are vomiting and cannot keep down any medicines or water.  This information is not intended to replace advice given to you by your health care provider. Make sure you discuss any questions you have with your health care provider.

## 2023-04-05 NOTE — ED PROVIDER NOTE - NSICDXPASTMEDICALHX_GEN_ALL_CORE_FT
PAST MEDICAL HISTORY:  COVID-19 virus infection March2020 , admitted to [facility] 7 days    History of hematuria     Lesion of urinary bladder     Osteoarthritis of left ankle

## 2023-04-05 NOTE — ED PROVIDER NOTE - PATIENT PORTAL LINK FT
You can access the FollowMyHealth Patient Portal offered by Health system by registering at the following website: [email or web address]. By joining [facility]’s FollowMyHealth portal, you will also be able to view your health information using other applications (apps) compatible with our system.

## 2023-04-05 NOTE — ED ADULT TRIAGE NOTE - CHIEF COMPLAINT QUOTE
Pt. presents to ED c/o urinary frequency/urgency. Pt. had bladder cyst removal 3/31 with Dr Ulrich. Pt. feels like now he is retaining urine and cannot wait for his follow up appt tomorrow. Endorses some blood In urine as well

## 2023-04-05 NOTE — ED ADULT NURSE REASSESSMENT NOTE - NS ED NURSE REASSESS COMMENT FT1
handoff report taken from RAJANI KAY pt. noted resting comfortably in stretcher. No distress noted, denies pain, safety maintained. WCTM

## 2023-04-05 NOTE — ED PROVIDER NOTE - CLINICAL SUMMARY MEDICAL DECISION MAKING FREE TEXT BOX
57-year-old male presents the emergency department for urinary frequency urgency in the setting of recent bladder procedure.  Vital signs are normal no signs of sepsis will check for UTI urinary retention no signs or concern for kidney stone will check urine bedside ultrasound reassess 57-year-old male presents the emergency department for urinary frequency urgency in the setting of recent bladder procedure.  Vital signs are normal no signs of sepsis will check for UTI urinary retention no signs or concern for kidney stone will check urine bedside ultrasound reassess    Bedside ultrasound performed.  Postvoid residual volume of 20 cc.  No obvious clots within the bladder.  Awaiting results of UA. 57-year-old male presents the emergency department for urinary frequency urgency in the setting of recent bladder procedure.  Vital signs are normal no signs of sepsis will check for UTI urinary retention no signs or concern for kidney stone will check urine bedside ultrasound reassess    Bedside ultrasound performed.  Postvoid residual volume of 20 cc.  No obvious clots within the bladder.  Awaiting results of UA.    Urine consistent with UTI.  Patient without fever tachycardia or hypotension.  No signs of sepsis.  No signs of pyelonephritis.  Reassessed patient he is feeling well and is comfortable going home.  He states he was recently on antibiotics prophylactically cefuroxime after procedure.  States that he has follow-up with Dr. Ulrich tomorrow morning.  Patient denies any sulfa allergy.  No issues with kidney function.  Reviewed previous labs from 2 weeks ago creatinine was normal.  Will order Bactrim as patient was recently on a cephalosporin.  Will discharge with follow-up and strict return precautions.

## 2023-04-05 NOTE — ED PROVIDER NOTE - NS ED ROS FT
Constitutional: No fever or chills  Eyes: No visual changes  HEENT: No throat pain  CV: No chest pain  Resp: No SOB no cough  GI: No abd pain, nausea or vomiting  : + urinary frequency/urgency  MSK: No musculoskeletal pain  Skin: No rash  Neuro: No headache

## 2023-04-06 ENCOUNTER — APPOINTMENT (OUTPATIENT)
Dept: UROLOGY | Facility: CLINIC | Age: 58
End: 2023-04-06
Payer: COMMERCIAL

## 2023-04-06 VITALS
SYSTOLIC BLOOD PRESSURE: 121 MMHG | BODY MASS INDEX: 24.88 KG/M2 | WEIGHT: 168 LBS | HEIGHT: 69 IN | DIASTOLIC BLOOD PRESSURE: 83 MMHG | OXYGEN SATURATION: 96 % | HEART RATE: 54 BPM | TEMPERATURE: 97.8 F

## 2023-04-06 DIAGNOSIS — C67.2 MALIGNANT NEOPLASM OF LATERAL WALL OF BLADDER: ICD-10-CM

## 2023-04-06 DIAGNOSIS — E03.9 HYPOTHYROIDISM, UNSPECIFIED: ICD-10-CM

## 2023-04-06 DIAGNOSIS — E78.5 HYPERLIPIDEMIA, UNSPECIFIED: ICD-10-CM

## 2023-04-06 LAB
CULTURE RESULTS: SIGNIFICANT CHANGE UP
SPECIMEN SOURCE: SIGNIFICANT CHANGE UP

## 2023-04-06 PROCEDURE — 99213 OFFICE O/P EST LOW 20 MIN: CPT

## 2023-04-06 NOTE — ASSESSMENT
[FreeTextEntry1] : 57 year old man with Ta UCC. Recommended cystoscopy in 3 months then annually. Pt states he does not think he will tolerate office cysto, requesting in OR. WIll schedule.

## 2023-04-06 NOTE — HISTORY OF PRESENT ILLNESS
[FreeTextEntry1] : 57 year old man with complaint of gross hematuria x 1 episode about a month ago. Patient reports he had some urinary symptoms like urinry frequency and urgency for which he saw his PCP couple of months ago and microscopic hematuria was found, he was given abx and Flomax but did not take the flomax. He reports a month after that he had an episode of painless gross hematuria which resolved spontaneously. He continues to note some urinary frequency and urgency. PSA WNL. \par It is associated with nothing.\par No current gross hematuria, no dysuria, no hesitancy, no straining. No incontinence. \par No fevers, no chills, no nausea, no vomiting, no flank pain. \par Family hx of prostate ca- father. \par \par 04/06/2023: Patient presents for follow up. He is s/p TURBT. Path shows Ta UCC. Pt doing well post op. Some hematuria, urgency, but resolving.

## 2023-04-07 DIAGNOSIS — R39.15 URGENCY OF URINATION: ICD-10-CM

## 2023-04-07 RX ORDER — MIRABEGRON 50 MG/1
50 TABLET, FILM COATED, EXTENDED RELEASE ORAL
Qty: 30 | Refills: 5 | Status: ACTIVE | COMMUNITY
Start: 2023-04-07 | End: 1900-01-01

## 2023-04-07 RX ORDER — OXYBUTYNIN CHLORIDE 5 MG/1
5 TABLET ORAL 3 TIMES DAILY
Qty: 60 | Refills: 11 | Status: ACTIVE | COMMUNITY
Start: 2023-04-07 | End: 1900-01-01

## 2023-04-07 RX ORDER — CIPROFLOXACIN HYDROCHLORIDE 500 MG/1
500 TABLET, FILM COATED ORAL
Qty: 1 | Refills: 0 | Status: COMPLETED | COMMUNITY
Start: 2023-03-21 | End: 2023-04-07

## 2023-04-11 ENCOUNTER — NON-APPOINTMENT (OUTPATIENT)
Age: 58
End: 2023-04-11

## 2023-04-11 PROBLEM — R52 BURNING PAIN: Status: ACTIVE | Noted: 2023-04-11

## 2023-04-12 LAB
APPEARANCE: CLEAR
BACTERIA UR CULT: NORMAL
BACTERIA: NEGATIVE
BILIRUBIN URINE: ABNORMAL
BLOOD URINE: ABNORMAL
COLOR: ABNORMAL
GLUCOSE QUALITATIVE U: ABNORMAL
HYALINE CASTS: 0 /LPF
KETONES URINE: NEGATIVE
LEUKOCYTE ESTERASE URINE: NEGATIVE
MICROSCOPIC-UA: NORMAL
NITRITE URINE: POSITIVE
PH URINE: 5.5
PROTEIN URINE: ABNORMAL
RED BLOOD CELLS URINE: 434 /HPF
SPECIFIC GRAVITY URINE: >=1.03
SQUAMOUS EPITHELIAL CELLS: 0 /HPF
UROBILINOGEN URINE: ABNORMAL
WHITE BLOOD CELLS URINE: 4 /HPF

## 2023-04-13 ENCOUNTER — NON-APPOINTMENT (OUTPATIENT)
Age: 58
End: 2023-04-13

## 2023-05-08 ENCOUNTER — NON-APPOINTMENT (OUTPATIENT)
Age: 58
End: 2023-05-08

## 2023-05-11 ENCOUNTER — NON-APPOINTMENT (OUTPATIENT)
Age: 58
End: 2023-05-11

## 2023-05-12 NOTE — CDI QUERY NOTE - NSCDINOTEDOCCLARIFICATION_GEN_A_CORE
PLEASE INCLUDE MORE SPECIFIC DOCUMENTATION IN YOUR PROGRESS NOTE AND DISCHARGE SUMMARY.  The documentation in this patient's medical record requires additional clarification to ensure that we accurately capture the patients diagnosis(es), treatment and/or severity of illness. Please document to the greatest level of specificity all corresponding diagnoses (either known or suspected) and/or treatment associated with the clinical information described below. Attending Only

## 2023-05-18 ENCOUNTER — APPOINTMENT (OUTPATIENT)
Dept: UROLOGY | Facility: CLINIC | Age: 58
End: 2023-05-18
Payer: COMMERCIAL

## 2023-05-18 DIAGNOSIS — M62.89 OTHER SPECIFIED DISORDERS OF MUSCLE: ICD-10-CM

## 2023-05-18 PROCEDURE — 99213 OFFICE O/P EST LOW 20 MIN: CPT

## 2023-05-18 NOTE — HISTORY OF PRESENT ILLNESS
[FreeTextEntry1] : 57 year old man with complaint of gross hematuria x 1 episode about a month ago. Patient reports he had some urinary symptoms like urinry frequency and urgency for which he saw his PCP couple of months ago and microscopic hematuria was found, he was given abx and Flomax but did not take the flomax. He reports a month after that he had an episode of painless gross hematuria which resolved spontaneously. He continues to note some urinary frequency and urgency. PSA WNL. \par It is associated with nothing.\par No current gross hematuria, no dysuria, no hesitancy, no straining. No incontinence. \par No fevers, no chills, no nausea, no vomiting, no flank pain. \par Family hx of prostate ca- father. \par \par 04/06/2023: Patient presents for follow up. He is s/p TURBT. Path shows Ta UCC. Pt doing well post op. Some hematuria, urgency, but resolving. \par \par 05/18/2023: Patient presents for follow up. He reports soreness with voiding, pelvic pain, and constipation.

## 2023-05-18 NOTE — PHYSICAL EXAM

## 2023-05-18 NOTE — ASSESSMENT
[FreeTextEntry1] : 58 year old M with pevlic pain, pain with voiding.  and PFM spasm on exam consistent with Pelvic Floor Dysfunction. We discussed that treatment for pelvic floor dysfunction include physical therapy and daily diazepam suppositories. Patient would like to think about it for now. Will do warm soaks and call if he wants to try PFPT and suppositories

## 2023-06-27 ENCOUNTER — OUTPATIENT (OUTPATIENT)
Dept: OUTPATIENT SERVICES | Facility: HOSPITAL | Age: 58
LOS: 1 days | End: 2023-06-27
Payer: COMMERCIAL

## 2023-06-27 VITALS
OXYGEN SATURATION: 100 % | SYSTOLIC BLOOD PRESSURE: 127 MMHG | RESPIRATION RATE: 16 BRPM | WEIGHT: 179.9 LBS | DIASTOLIC BLOOD PRESSURE: 72 MMHG | HEART RATE: 78 BPM | TEMPERATURE: 98 F | HEIGHT: 69 IN

## 2023-06-27 DIAGNOSIS — Z98.890 OTHER SPECIFIED POSTPROCEDURAL STATES: Chronic | ICD-10-CM

## 2023-06-27 DIAGNOSIS — Z96.662 PRESENCE OF LEFT ARTIFICIAL ANKLE JOINT: Chronic | ICD-10-CM

## 2023-06-27 DIAGNOSIS — C67.9 MALIGNANT NEOPLASM OF BLADDER, UNSPECIFIED: ICD-10-CM

## 2023-06-27 DIAGNOSIS — Z01.818 ENCOUNTER FOR OTHER PREPROCEDURAL EXAMINATION: ICD-10-CM

## 2023-06-27 LAB
ANION GAP SERPL CALC-SCNC: 6 MMOL/L — SIGNIFICANT CHANGE UP (ref 5–17)
APPEARANCE UR: CLEAR — SIGNIFICANT CHANGE UP
APTT BLD: 34.2 SEC — SIGNIFICANT CHANGE UP (ref 27.5–35.5)
BACTERIA # UR AUTO: ABNORMAL
BASOPHILS # BLD AUTO: 0.05 K/UL — SIGNIFICANT CHANGE UP (ref 0–0.2)
BASOPHILS NFR BLD AUTO: 0.6 % — SIGNIFICANT CHANGE UP (ref 0–2)
BILIRUB UR-MCNC: NEGATIVE — SIGNIFICANT CHANGE UP
BLD GP AB SCN SERPL QL: SIGNIFICANT CHANGE UP
BUN SERPL-MCNC: 21 MG/DL — SIGNIFICANT CHANGE UP (ref 7–23)
CALCIUM SERPL-MCNC: 9 MG/DL — SIGNIFICANT CHANGE UP (ref 8.5–10.1)
CHLORIDE SERPL-SCNC: 106 MMOL/L — SIGNIFICANT CHANGE UP (ref 96–108)
CO2 SERPL-SCNC: 27 MMOL/L — SIGNIFICANT CHANGE UP (ref 22–31)
COLOR SPEC: YELLOW — SIGNIFICANT CHANGE UP
CREAT SERPL-MCNC: 1.18 MG/DL — SIGNIFICANT CHANGE UP (ref 0.5–1.3)
DIFF PNL FLD: NEGATIVE — SIGNIFICANT CHANGE UP
EGFR: 71 ML/MIN/1.73M2 — SIGNIFICANT CHANGE UP
EOSINOPHIL # BLD AUTO: 0.17 K/UL — SIGNIFICANT CHANGE UP (ref 0–0.5)
EOSINOPHIL NFR BLD AUTO: 2.1 % — SIGNIFICANT CHANGE UP (ref 0–6)
EPI CELLS # UR: SIGNIFICANT CHANGE UP
GLUCOSE SERPL-MCNC: 97 MG/DL — SIGNIFICANT CHANGE UP (ref 70–99)
GLUCOSE UR QL: NEGATIVE — SIGNIFICANT CHANGE UP
HCT VFR BLD CALC: 40.6 % — SIGNIFICANT CHANGE UP (ref 39–50)
HGB BLD-MCNC: 14 G/DL — SIGNIFICANT CHANGE UP (ref 13–17)
IMM GRANULOCYTES NFR BLD AUTO: 0.3 % — SIGNIFICANT CHANGE UP (ref 0–0.9)
INR BLD: 0.99 RATIO — SIGNIFICANT CHANGE UP (ref 0.88–1.16)
KETONES UR-MCNC: NEGATIVE — SIGNIFICANT CHANGE UP
LEUKOCYTE ESTERASE UR-ACNC: ABNORMAL
LYMPHOCYTES # BLD AUTO: 1.15 K/UL — SIGNIFICANT CHANGE UP (ref 1–3.3)
LYMPHOCYTES # BLD AUTO: 14.5 % — SIGNIFICANT CHANGE UP (ref 13–44)
MCHC RBC-ENTMCNC: 31.9 PG — SIGNIFICANT CHANGE UP (ref 27–34)
MCHC RBC-ENTMCNC: 34.5 GM/DL — SIGNIFICANT CHANGE UP (ref 32–36)
MCV RBC AUTO: 92.5 FL — SIGNIFICANT CHANGE UP (ref 80–100)
MONOCYTES # BLD AUTO: 0.5 K/UL — SIGNIFICANT CHANGE UP (ref 0–0.9)
MONOCYTES NFR BLD AUTO: 6.3 % — SIGNIFICANT CHANGE UP (ref 2–14)
NEUTROPHILS # BLD AUTO: 6.04 K/UL — SIGNIFICANT CHANGE UP (ref 1.8–7.4)
NEUTROPHILS NFR BLD AUTO: 76.2 % — SIGNIFICANT CHANGE UP (ref 43–77)
NITRITE UR-MCNC: NEGATIVE — SIGNIFICANT CHANGE UP
PH UR: 5 — SIGNIFICANT CHANGE UP (ref 5–8)
PLATELET # BLD AUTO: 285 K/UL — SIGNIFICANT CHANGE UP (ref 150–400)
POTASSIUM SERPL-MCNC: 4 MMOL/L — SIGNIFICANT CHANGE UP (ref 3.5–5.3)
POTASSIUM SERPL-SCNC: 4 MMOL/L — SIGNIFICANT CHANGE UP (ref 3.5–5.3)
PROT UR-MCNC: NEGATIVE — SIGNIFICANT CHANGE UP
PROTHROM AB SERPL-ACNC: 11.5 SEC — SIGNIFICANT CHANGE UP (ref 10.5–13.4)
RBC # BLD: 4.39 M/UL — SIGNIFICANT CHANGE UP (ref 4.2–5.8)
RBC # FLD: 12.5 % — SIGNIFICANT CHANGE UP (ref 10.3–14.5)
RBC CASTS # UR COMP ASSIST: SIGNIFICANT CHANGE UP /HPF (ref 0–4)
SODIUM SERPL-SCNC: 139 MMOL/L — SIGNIFICANT CHANGE UP (ref 135–145)
SP GR SPEC: 1.02 — SIGNIFICANT CHANGE UP (ref 1.01–1.02)
UROBILINOGEN FLD QL: NEGATIVE — SIGNIFICANT CHANGE UP
WBC # BLD: 7.93 K/UL — SIGNIFICANT CHANGE UP (ref 3.8–10.5)
WBC # FLD AUTO: 7.93 K/UL — SIGNIFICANT CHANGE UP (ref 3.8–10.5)
WBC UR QL: ABNORMAL /HPF (ref 0–5)

## 2023-06-27 PROCEDURE — 85610 PROTHROMBIN TIME: CPT

## 2023-06-27 PROCEDURE — 85025 COMPLETE CBC W/AUTO DIFF WBC: CPT

## 2023-06-27 PROCEDURE — 85730 THROMBOPLASTIN TIME PARTIAL: CPT

## 2023-06-27 PROCEDURE — 87086 URINE CULTURE/COLONY COUNT: CPT

## 2023-06-27 PROCEDURE — 36415 COLL VENOUS BLD VENIPUNCTURE: CPT

## 2023-06-27 PROCEDURE — 80048 BASIC METABOLIC PNL TOTAL CA: CPT

## 2023-06-27 PROCEDURE — 86900 BLOOD TYPING SEROLOGIC ABO: CPT

## 2023-06-27 PROCEDURE — 86901 BLOOD TYPING SEROLOGIC RH(D): CPT

## 2023-06-27 PROCEDURE — 99214 OFFICE O/P EST MOD 30 MIN: CPT | Mod: 25

## 2023-06-27 PROCEDURE — 86850 RBC ANTIBODY SCREEN: CPT

## 2023-06-27 PROCEDURE — 81001 URINALYSIS AUTO W/SCOPE: CPT

## 2023-06-27 NOTE — H&P PST ADULT - NSICDXPASTSURGICALHX_GEN_ALL_CORE_FT
PAST SURGICAL HISTORY:  H/O cystoscopy     H/O total ankle replacement, left     H/O transurethral resection of bladder tumor (TURBT)

## 2023-06-27 NOTE — H&P PST ADULT - NSICDXPASTMEDICALHX_GEN_ALL_CORE_FT
PAST MEDICAL HISTORY:  Cancer of bladder     COVID-19 virus infection March2020 , admitted to [facility] 7 days    History of hematuria     Lesion of urinary bladder     Osteoarthritis of left ankle

## 2023-06-27 NOTE — H&P PST ADULT - PRO ARRIVE FROM
[FreeTextEntry1] : Supportive measures for upper respiratory infection were discussed. Such measures include use of nasal saline and suction as needed to clear the nasal passages, increasing fluids, hot showers or steam from the bathroom. Tylenol can be used every 4 hours as needed for fever or pain and Motrin can be used every 6 hours as needed for fever or pain.  If child has a fever of 100.4 or more or symptoms are worsening at any time, return for recheck or seek other medical attention.\par  home

## 2023-06-27 NOTE — H&P PST ADULT - HISTORY OF PRESENT ILLNESS
59 y/o male with bladder lesion s/p TURBT with installation of chemotherapy on 3/2023. Patient presents to [facility] for scheduled cystoscopy on 7/5/23. Patient reports occasional hematuria with blood clots.

## 2023-06-27 NOTE — H&P PST ADULT - NSICDXFAMILYHX_GEN_ALL_CORE_FT
FAMILY HISTORY:  Father  Still living? No  Family history of skin cancer, Age at diagnosis: Age Unknown  FH: prostate cancer, Age at diagnosis: Age Unknown  FHx: arthritis, Age at diagnosis: Age Unknown    Mother  Still living? Yes, Estimated age: 81-90  FHx: arthritis, Age at diagnosis: Age Unknown

## 2023-06-27 NOTE — H&P PST ADULT - ASSESSMENT
59 y/o male with bladder lesion s/p TURBT with installation of chemotherapy on 3/2023. Patient presents to [facility] for scheduled cystoscopy on 7/5/23. Patient reports occasional hematuria with blood clots.

## 2023-06-27 NOTE — H&P PST ADULT - PROBLEM SELECTOR PLAN 1
Pre op instructions given and explained.  Avoid NSAIDs and OTC supplements.   Patient verbalized understanding

## 2023-06-28 DIAGNOSIS — Z01.818 ENCOUNTER FOR OTHER PREPROCEDURAL EXAMINATION: ICD-10-CM

## 2023-06-28 LAB
CULTURE RESULTS: SIGNIFICANT CHANGE UP
SPECIMEN SOURCE: SIGNIFICANT CHANGE UP

## 2023-07-03 RX ORDER — SODIUM CHLORIDE 9 MG/ML
3 INJECTION INTRAMUSCULAR; INTRAVENOUS; SUBCUTANEOUS EVERY 8 HOURS
Refills: 0 | Status: DISCONTINUED | OUTPATIENT
Start: 2023-07-05 | End: 2023-07-05

## 2023-07-03 RX ORDER — SODIUM CHLORIDE 9 MG/ML
1000 INJECTION, SOLUTION INTRAVENOUS
Refills: 0 | Status: DISCONTINUED | OUTPATIENT
Start: 2023-07-05 | End: 2023-07-05

## 2023-07-03 RX ORDER — FENTANYL CITRATE 50 UG/ML
50 INJECTION INTRAVENOUS
Refills: 0 | Status: DISCONTINUED | OUTPATIENT
Start: 2023-07-05 | End: 2023-07-05

## 2023-07-03 RX ORDER — OXYCODONE HYDROCHLORIDE 5 MG/1
5 TABLET ORAL ONCE
Refills: 0 | Status: DISCONTINUED | OUTPATIENT
Start: 2023-07-05 | End: 2023-07-05

## 2023-07-05 ENCOUNTER — APPOINTMENT (OUTPATIENT)
Dept: UROLOGY | Facility: HOSPITAL | Age: 58
End: 2023-07-05

## 2023-07-05 ENCOUNTER — TRANSCRIPTION ENCOUNTER (OUTPATIENT)
Age: 58
End: 2023-07-05

## 2023-07-05 ENCOUNTER — RESULT REVIEW (OUTPATIENT)
Age: 58
End: 2023-07-05

## 2023-07-05 ENCOUNTER — OUTPATIENT (OUTPATIENT)
Dept: INPATIENT UNIT | Facility: HOSPITAL | Age: 58
LOS: 1 days | Discharge: HOME | End: 2023-07-05
Payer: COMMERCIAL

## 2023-07-05 VITALS
OXYGEN SATURATION: 100 % | HEART RATE: 47 BPM | HEIGHT: 69 IN | TEMPERATURE: 97 F | SYSTOLIC BLOOD PRESSURE: 127 MMHG | RESPIRATION RATE: 16 BRPM | DIASTOLIC BLOOD PRESSURE: 87 MMHG | WEIGHT: 179.9 LBS

## 2023-07-05 VITALS
DIASTOLIC BLOOD PRESSURE: 82 MMHG | SYSTOLIC BLOOD PRESSURE: 121 MMHG | TEMPERATURE: 97 F | HEART RATE: 54 BPM | RESPIRATION RATE: 14 BRPM | OXYGEN SATURATION: 100 %

## 2023-07-05 DIAGNOSIS — Z96.662 PRESENCE OF LEFT ARTIFICIAL ANKLE JOINT: Chronic | ICD-10-CM

## 2023-07-05 DIAGNOSIS — Z98.890 OTHER SPECIFIED POSTPROCEDURAL STATES: Chronic | ICD-10-CM

## 2023-07-05 DIAGNOSIS — C67.9 MALIGNANT NEOPLASM OF BLADDER, UNSPECIFIED: ICD-10-CM

## 2023-07-05 PROCEDURE — 88307 TISSUE EXAM BY PATHOLOGIST: CPT | Mod: 26

## 2023-07-05 PROCEDURE — 88307 TISSUE EXAM BY PATHOLOGIST: CPT

## 2023-07-05 PROCEDURE — 52214 CYSTOSCOPY AND TREATMENT: CPT

## 2023-07-05 PROCEDURE — 88104 CYTOPATH FL NONGYN SMEARS: CPT

## 2023-07-05 PROCEDURE — 88104 CYTOPATH FL NONGYN SMEARS: CPT | Mod: 26

## 2023-07-05 RX ORDER — TRAMADOL HYDROCHLORIDE 50 MG/1
1 TABLET ORAL
Qty: 12 | Refills: 0
Start: 2023-07-05 | End: 2023-07-07

## 2023-07-05 RX ORDER — FAMOTIDINE 10 MG/ML
20 INJECTION INTRAVENOUS ONCE
Refills: 0 | Status: COMPLETED | OUTPATIENT
Start: 2023-07-05 | End: 2023-07-05

## 2023-07-05 RX ORDER — PHENAZOPYRIDINE HCL 100 MG
200 TABLET ORAL ONCE
Refills: 0 | Status: COMPLETED | OUTPATIENT
Start: 2023-07-05 | End: 2023-07-05

## 2023-07-05 RX ORDER — PHENAZOPYRIDINE HCL 100 MG
1 TABLET ORAL
Qty: 9 | Refills: 0
Start: 2023-07-05 | End: 2023-07-07

## 2023-07-05 RX ORDER — MORPHINE SULFATE 50 MG/1
4 CAPSULE, EXTENDED RELEASE ORAL EVERY 4 HOURS
Refills: 0 | Status: DISCONTINUED | OUTPATIENT
Start: 2023-07-05 | End: 2023-07-05

## 2023-07-05 RX ORDER — OXYCODONE AND ACETAMINOPHEN 5; 325 MG/1; MG/1
1 TABLET ORAL EVERY 4 HOURS
Refills: 0 | Status: DISCONTINUED | OUTPATIENT
Start: 2023-07-05 | End: 2023-07-05

## 2023-07-05 RX ORDER — ASCORBIC ACID 60 MG
1 TABLET,CHEWABLE ORAL
Qty: 0 | Refills: 0 | DISCHARGE

## 2023-07-05 RX ORDER — CEFUROXIME AXETIL 250 MG
1 TABLET ORAL
Qty: 6 | Refills: 0
Start: 2023-07-05 | End: 2023-07-07

## 2023-07-05 RX ORDER — OXYBUTYNIN CHLORIDE 5 MG
5 TABLET ORAL ONCE
Refills: 0 | Status: COMPLETED | OUTPATIENT
Start: 2023-07-05 | End: 2023-07-05

## 2023-07-05 RX ORDER — OXYBUTYNIN CHLORIDE 5 MG
1 TABLET ORAL
Qty: 30 | Refills: 0
Start: 2023-07-05 | End: 2023-07-14

## 2023-07-05 RX ORDER — ACETAMINOPHEN 500 MG
975 TABLET ORAL ONCE
Refills: 0 | Status: COMPLETED | OUTPATIENT
Start: 2023-07-05 | End: 2023-07-05

## 2023-07-05 RX ORDER — IBUPROFEN 200 MG
400 TABLET ORAL EVERY 8 HOURS
Refills: 0 | Status: DISCONTINUED | OUTPATIENT
Start: 2023-07-05 | End: 2023-07-05

## 2023-07-05 RX ORDER — ONDANSETRON 8 MG/1
4 TABLET, FILM COATED ORAL EVERY 6 HOURS
Refills: 0 | Status: DISCONTINUED | OUTPATIENT
Start: 2023-07-05 | End: 2023-07-05

## 2023-07-05 RX ORDER — ACETAMINOPHEN 500 MG
2 TABLET ORAL
Qty: 0 | Refills: 0 | DISCHARGE

## 2023-07-05 RX ORDER — CHOLECALCIFEROL (VITAMIN D3) 125 MCG
1 CAPSULE ORAL
Qty: 0 | Refills: 0 | DISCHARGE

## 2023-07-05 RX ORDER — IBUPROFEN 200 MG
1 TABLET ORAL
Qty: 0 | Refills: 0 | DISCHARGE

## 2023-07-05 RX ADMIN — Medication 5 MILLIGRAM(S): at 08:38

## 2023-07-05 RX ADMIN — Medication 200 MILLIGRAM(S): at 08:39

## 2023-07-05 NOTE — ASU PREOP CHECKLIST - ALLERGY BAND ON
bilateral upper extremity ROM was WFL (within functional limits)/bilateral lower extremity ROM was WFL (within functional limits) no known allergies

## 2023-07-05 NOTE — ASU PATIENT PROFILE, ADULT - FALL HARM RISK - UNIVERSAL INTERVENTIONS
Bed in lowest position, wheels locked, appropriate side rails in place/Call bell, personal items and telephone in reach/Instruct patient to call for assistance before getting out of bed or chair/Non-slip footwear when patient is out of bed/[place] to call system/Physically safe environment - no spills, clutter or unnecessary equipment/Purposeful Proactive Rounding/Room/bathroom lighting operational, light cord in reach

## 2023-07-05 NOTE — ASU DISCHARGE PLAN (ADULT/PEDIATRIC) - CALL YOUR DOCTOR IF YOU HAVE ANY OF THE FOLLOWING:
Pain not relieved by Medications/Fever greater than (need to indicate Fahrenheit or Celsius)/Nausea and vomiting that does not stop/Unable to urinate
Left arm in sling; wrapped; crying when near; will defer further exam

## 2023-07-05 NOTE — BRIEF OPERATIVE NOTE - OPERATION/FINDINGS
necrotic lesions on posterior wall of bladder, no viable tumor. lesions resected with biopsy forceps and bases biopsied. area of lesions fulgurated for bleeding as well as disease control

## 2023-07-05 NOTE — ASU PATIENT PROFILE, ADULT - NSICDXPASTMEDICALHX_GEN_ALL_CORE_FT
PAST MEDICAL HISTORY:  Cancer of bladder     COVID-19 virus infection March2020 , admitted to [facility] 7 days    History of hematuria     Lesion of urinary bladder     Osteoarthritis of left ankle

## 2023-07-05 NOTE — ASU DISCHARGE PLAN (ADULT/PEDIATRIC) - CARE PROVIDER_API CALL
Alvino Ulrich  Urology  [address] [place] [address], [address]  [place] [address]  Phone: [phone]  Fax: [phone]  Established Patient  Scheduled Appointment: 07/13/2023 02:50 PM

## 2023-07-05 NOTE — ASU DISCHARGE PLAN (ADULT/PEDIATRIC) - NURSING INSTRUCTIONS
yes Review the post surgery written discharge  fact sheet.  Call MD if unable to urinate in 8 hours   A responsible adult should stay with you today.

## 2023-07-05 NOTE — ASU DISCHARGE PLAN (ADULT/PEDIATRIC) - PROVIDER TOKENS
PROVIDER:[TOKEN:[[id number]],SCHEDULEDAPPT:[07/13/2023],SCHEDULEDAPPTTIME:[02:50 PM],ESTABLISHEDPATIENT:[T]]

## 2023-07-05 NOTE — BRIEF OPERATIVE NOTE - NSICDXBRIEFPROCEDURE_GEN_ALL_CORE_FT
PROCEDURES:  Cystoscopy, with excision and fulguration of bladder neoplasm 05-Jul-2023 08:13:07  Alvino Ulrich

## 2023-07-10 LAB — SURGICAL PATHOLOGY STUDY: SIGNIFICANT CHANGE UP

## 2023-07-11 DIAGNOSIS — N32.89 OTHER SPECIFIED DISORDERS OF BLADDER: ICD-10-CM

## 2023-07-11 DIAGNOSIS — D09.0 CARCINOMA IN SITU OF BLADDER: ICD-10-CM

## 2023-07-11 DIAGNOSIS — J45.909 UNSPECIFIED ASTHMA, UNCOMPLICATED: ICD-10-CM

## 2023-07-11 DIAGNOSIS — I10 ESSENTIAL (PRIMARY) HYPERTENSION: ICD-10-CM

## 2023-07-13 ENCOUNTER — APPOINTMENT (OUTPATIENT)
Dept: UROLOGY | Facility: CLINIC | Age: 58
End: 2023-07-13
Payer: COMMERCIAL

## 2023-07-13 VITALS
HEART RATE: 59 BPM | HEIGHT: 69 IN | SYSTOLIC BLOOD PRESSURE: 127 MMHG | WEIGHT: 170 LBS | BODY MASS INDEX: 25.18 KG/M2 | DIASTOLIC BLOOD PRESSURE: 79 MMHG | OXYGEN SATURATION: 97 %

## 2023-07-13 PROCEDURE — 99213 OFFICE O/P EST LOW 20 MIN: CPT

## 2023-07-13 NOTE — ASSESSMENT
[FreeTextEntry1] : 58 year old M with Tis UCC. Recommend BCG instillations. Will request approval of release of BCG and schedule pt for instillations.

## 2023-07-13 NOTE — PHYSICAL EXAM

## 2023-07-13 NOTE — HISTORY OF PRESENT ILLNESS
[FreeTextEntry1] : 57 year old man with complaint of gross hematuria x 1 episode about a month ago. Patient reports he had some urinary symptoms like urinry frequency and urgency for which he saw his PCP couple of months ago and microscopic hematuria was found, he was given abx and Flomax but did not take the flomax. He reports a month after that he had an episode of painless gross hematuria which resolved spontaneously. He continues to note some urinary frequency and urgency. PSA WNL. \par It is associated with nothing.\par No current gross hematuria, no dysuria, no hesitancy, no straining. No incontinence. \par No fevers, no chills, no nausea, no vomiting, no flank pain. \par Family hx of prostate ca- father. \par \par 04/06/2023: Patient presents for follow up. He is s/p TURBT. Path shows Ta UCC. Pt doing well post op. Some hematuria, urgency, but resolving. \par \par 05/18/2023: Patient presents for follow up. He reports soreness with voiding, pelvic pain, and constipation. \par \par 07/13/2023: Patient presents for follow up. He had cysto in OR, showed necrotic tissue on posterior wall of bladder. This was biopsied and base of lesions were biopsied as well, then whole area fulgurated. Path showed CIS.

## 2023-07-14 PROBLEM — C67.9 MALIGNANT NEOPLASM OF BLADDER, UNSPECIFIED: Chronic | Status: ACTIVE | Noted: 2023-06-27

## 2023-07-18 ENCOUNTER — NON-APPOINTMENT (OUTPATIENT)
Age: 58
End: 2023-07-18

## 2023-08-03 ENCOUNTER — APPOINTMENT (OUTPATIENT)
Dept: UROLOGY | Facility: CLINIC | Age: 58
End: 2023-08-03

## 2023-08-03 VITALS
HEART RATE: 68 BPM | SYSTOLIC BLOOD PRESSURE: 122 MMHG | WEIGHT: 165 LBS | OXYGEN SATURATION: 96 % | DIASTOLIC BLOOD PRESSURE: 80 MMHG | BODY MASS INDEX: 24.44 KG/M2 | HEIGHT: 69 IN

## 2023-08-03 LAB
BILIRUB UR QL STRIP: NEGATIVE
GLUCOSE UR-MCNC: NEGATIVE
HCG UR QL: 0.2 EU/DL
HGB UR QL STRIP.AUTO: NORMAL
KETONES UR-MCNC: NEGATIVE
LEUKOCYTE ESTERASE UR QL STRIP: NORMAL
NITRITE UR QL STRIP: NEGATIVE
PH UR STRIP: 5.5
PROT UR STRIP-MCNC: 100
SP GR UR STRIP: >=1.03

## 2023-08-08 LAB
APPEARANCE: CLEAR
BACTERIA: NEGATIVE /HPF
BILIRUBIN URINE: NEGATIVE
BLOOD URINE: ABNORMAL
CAST: 0 /LPF
COLOR: NORMAL
EPITHELIAL CELLS: 1 /HPF
GLUCOSE QUALITATIVE U: NEGATIVE MG/DL
KETONES URINE: NEGATIVE MG/DL
LEUKOCYTE ESTERASE URINE: ABNORMAL
MICROSCOPIC-UA: NORMAL
NITRITE URINE: NEGATIVE
PH URINE: 5.5
PROTEIN URINE: 30 MG/DL
RED BLOOD CELLS URINE: 17 /HPF
SPECIFIC GRAVITY URINE: >1.03
UROBILINOGEN URINE: 0.2 MG/DL
WHITE BLOOD CELLS URINE: 5 /HPF

## 2023-08-09 LAB — BACTERIA UR CULT: NORMAL

## 2023-08-10 ENCOUNTER — APPOINTMENT (OUTPATIENT)
Dept: UROLOGY | Facility: CLINIC | Age: 58
End: 2023-08-10
Payer: COMMERCIAL

## 2023-08-10 LAB
BILIRUBIN URINE: NORMAL
BLOOD URINE: NORMAL
GLUCOSE QUALITATIVE U: NORMAL
KETONES URINE: NORMAL
LEUKOCYTE ESTERASE URINE: NORMAL
NITRITE URINE: NORMAL
PH URINE: 5.5
PROTEIN URINE: 30
SPECIFIC GRAVITY URINE: 1.03
UROBILINOGEN URINE: 0.2

## 2023-08-10 PROCEDURE — 51720 TREATMENT OF BLADDER LESION: CPT

## 2023-08-10 RX ORDER — BACILLUS CALMETTE-GUERIN 50 MG/50ML
50 POWDER, FOR SUSPENSION INTRAVESICAL
Refills: 0 | Status: COMPLETED | OUTPATIENT
Start: 2023-08-10

## 2023-08-10 RX ADMIN — BACILLUS CALMETTE-GUERIN 0 MG: 50 POWDER, FOR SUSPENSION INTRAVESICAL at 00:00

## 2023-08-21 ENCOUNTER — APPOINTMENT (OUTPATIENT)
Dept: UROLOGY | Facility: CLINIC | Age: 58
End: 2023-08-21
Payer: COMMERCIAL

## 2023-08-21 VITALS
DIASTOLIC BLOOD PRESSURE: 76 MMHG | HEIGHT: 69 IN | BODY MASS INDEX: 24.44 KG/M2 | HEART RATE: 65 BPM | WEIGHT: 165 LBS | OXYGEN SATURATION: 97 % | SYSTOLIC BLOOD PRESSURE: 119 MMHG

## 2023-08-21 PROCEDURE — 99214 OFFICE O/P EST MOD 30 MIN: CPT

## 2023-08-21 NOTE — PHYSICAL EXAM
[General Appearance - Well Developed] : well developed [General Appearance - Well Nourished] : well nourished [Normal Appearance] : normal appearance [Well Groomed] : well groomed [General Appearance - In No Acute Distress] : no acute distress [Edema] : no peripheral edema [Respiration, Rhythm And Depth] : normal respiratory rhythm and effort [Exaggerated Use Of Accessory Muscles For Inspiration] : no accessory muscle use [Abdomen Tenderness] : non-tender [Abdomen Soft] : soft [Costovertebral Angle Tenderness] : no ~M costovertebral angle tenderness [Normal Station and Gait] : the gait and station were normal for the patient's age [] : no rash [No Focal Deficits] : no focal deficits [Oriented To Time, Place, And Person] : oriented to person, place, and time [Affect] : the affect was normal [Mood] : the mood was normal [Not Anxious] : not anxious [No Palpable Adenopathy] : no palpable adenopathy [Skin Color & Pigmentation] : normal skin color and pigmentation [Urethral Meatus] : meatus normal [Urinary Bladder Findings] : the bladder was normal on palpation [Scrotum] : the scrotum was normal [Testes Mass (___cm)] : there were no testicular masses

## 2023-08-21 NOTE — ASSESSMENT
[FreeTextEntry1] : discussed management of bladder cancer and CIS He needs a new CT Urogram finish BCG regimen cysto in 6 weeks after BCG with biopsies including NBI and mapping biopsies We discussed the natural hx and the mechanism of action of BCG and maintenance therapy after All his questions were answered

## 2023-08-21 NOTE — HISTORY OF PRESENT ILLNESS
[FreeTextEntry1] : 58 yom here for bladder cancer, s/p TURBT w/ Dr. Ulrich in July 2023  Path: 7/2023: HG CIS 1st path: LGTCC  CTU: 1.9cm right lateral wall lesion 1/2023  Started BCG last week, 4 more treatments.

## 2023-08-24 ENCOUNTER — APPOINTMENT (OUTPATIENT)
Dept: UROLOGY | Facility: CLINIC | Age: 58
End: 2023-08-24
Payer: COMMERCIAL

## 2023-08-24 PROCEDURE — 51720 TREATMENT OF BLADDER LESION: CPT

## 2023-08-24 RX ORDER — BACILLUS CALMETTE-GUERIN 50 MG/50ML
50 POWDER, FOR SUSPENSION INTRAVESICAL
Refills: 0 | Status: COMPLETED | OUTPATIENT
Start: 2023-08-24

## 2023-08-24 RX ADMIN — BACILLUS CALMETTE-GUERIN 0 MG: 50 POWDER, FOR SUSPENSION INTRAVESICAL at 00:00

## 2023-08-25 ENCOUNTER — APPOINTMENT (OUTPATIENT)
Dept: CT IMAGING | Facility: CLINIC | Age: 58
End: 2023-08-25
Payer: COMMERCIAL

## 2023-08-25 ENCOUNTER — OUTPATIENT (OUTPATIENT)
Dept: OUTPATIENT SERVICES | Facility: HOSPITAL | Age: 58
LOS: 1 days | End: 2023-08-25
Payer: COMMERCIAL

## 2023-08-25 DIAGNOSIS — N32.9 BLADDER DISORDER, UNSPECIFIED: ICD-10-CM

## 2023-08-25 DIAGNOSIS — Z98.890 OTHER SPECIFIED POSTPROCEDURAL STATES: Chronic | ICD-10-CM

## 2023-08-25 DIAGNOSIS — Z96.662 PRESENCE OF LEFT ARTIFICIAL ANKLE JOINT: Chronic | ICD-10-CM

## 2023-08-25 PROCEDURE — 74178 CT ABD&PLV WO CNTR FLWD CNTR: CPT | Mod: 26

## 2023-08-25 PROCEDURE — 74178 CT ABD&PLV WO CNTR FLWD CNTR: CPT

## 2023-08-31 ENCOUNTER — APPOINTMENT (OUTPATIENT)
Dept: UROLOGY | Facility: CLINIC | Age: 58
End: 2023-08-31
Payer: COMMERCIAL

## 2023-08-31 VITALS
HEIGHT: 69 IN | WEIGHT: 170 LBS | OXYGEN SATURATION: 99 % | SYSTOLIC BLOOD PRESSURE: 111 MMHG | RESPIRATION RATE: 15 BRPM | HEART RATE: 56 BPM | BODY MASS INDEX: 25.18 KG/M2 | DIASTOLIC BLOOD PRESSURE: 73 MMHG

## 2023-08-31 LAB
BILIRUB UR QL STRIP: NEGATIVE
GLUCOSE UR-MCNC: NEGATIVE
HCG UR QL: 0.2 EU/DL
HGB UR QL STRIP.AUTO: NORMAL
KETONES UR-MCNC: NEGATIVE
LEUKOCYTE ESTERASE UR QL STRIP: NEGATIVE
NITRITE UR QL STRIP: NEGATIVE
PH UR STRIP: 5.5
PROT UR STRIP-MCNC: NORMAL
SP GR UR STRIP: 1.03

## 2023-08-31 PROCEDURE — 81002 URINALYSIS NONAUTO W/O SCOPE: CPT

## 2023-08-31 PROCEDURE — 51720 TREATMENT OF BLADDER LESION: CPT

## 2023-08-31 RX ORDER — BACILLUS CALMETTE-GUERIN 50 MG/50ML
50 POWDER, FOR SUSPENSION INTRAVESICAL
Refills: 0 | Status: COMPLETED | OUTPATIENT
Start: 2023-08-31

## 2023-08-31 RX ADMIN — BACILLUS CALMETTE-GUERIN 0 MG: 50 POWDER, FOR SUSPENSION INTRAVESICAL at 00:00

## 2023-09-06 ENCOUNTER — APPOINTMENT (OUTPATIENT)
Dept: UROLOGY | Facility: CLINIC | Age: 58
End: 2023-09-06
Payer: COMMERCIAL

## 2023-09-06 LAB
BILIRUB UR QL STRIP: NEGATIVE
GLUCOSE UR-MCNC: NEGATIVE
HCG UR QL: 0.2 EU/DL
HGB UR QL STRIP.AUTO: NORMAL
KETONES UR-MCNC: NEGATIVE
LEUKOCYTE ESTERASE UR QL STRIP: NEGATIVE
NITRITE UR QL STRIP: NEGATIVE
PH UR STRIP: 6
PROT UR STRIP-MCNC: NORMAL
SP GR UR STRIP: 1.03

## 2023-09-06 PROCEDURE — 81002 URINALYSIS NONAUTO W/O SCOPE: CPT

## 2023-09-06 PROCEDURE — 51720 TREATMENT OF BLADDER LESION: CPT

## 2023-09-06 RX ORDER — PHENAZOPYRIDINE HYDROCHLORIDE 200 MG/1
200 TABLET ORAL 3 TIMES DAILY
Qty: 21 | Refills: 0 | Status: COMPLETED | COMMUNITY
Start: 2023-04-07 | End: 2023-09-06

## 2023-09-06 RX ORDER — NITROFURANTOIN MACROCRYSTALS 100 MG/1
100 CAPSULE ORAL
Qty: 14 | Refills: 0 | Status: COMPLETED | COMMUNITY
Start: 2023-04-11 | End: 2023-09-06

## 2023-09-06 RX ORDER — BACILLUS CALMETTE-GUERIN 50 MG/50ML
50 POWDER, FOR SUSPENSION INTRAVESICAL
Refills: 0 | Status: COMPLETED | OUTPATIENT
Start: 2023-09-06

## 2023-09-06 RX ADMIN — BACILLUS CALMETTE-GUERIN 0 MG: 50 POWDER, FOR SUSPENSION INTRAVESICAL at 00:00

## 2023-09-07 ENCOUNTER — APPOINTMENT (OUTPATIENT)
Dept: UROLOGY | Facility: CLINIC | Age: 58
End: 2023-09-07

## 2023-09-14 ENCOUNTER — APPOINTMENT (OUTPATIENT)
Dept: UROLOGY | Facility: CLINIC | Age: 58
End: 2023-09-14
Payer: COMMERCIAL

## 2023-09-14 LAB
BILIRUB UR QL STRIP: NEGATIVE
GLUCOSE UR-MCNC: NEGATIVE
HCG UR QL: 0.2 EU/DL
HGB UR QL STRIP.AUTO: NORMAL
KETONES UR-MCNC: NEGATIVE
LEUKOCYTE ESTERASE UR QL STRIP: NORMAL
NITRITE UR QL STRIP: NEGATIVE
PH UR STRIP: 6
PROT UR STRIP-MCNC: NORMAL
SP GR UR STRIP: 1.03

## 2023-09-14 PROCEDURE — 51720 TREATMENT OF BLADDER LESION: CPT

## 2023-09-14 PROCEDURE — 81002 URINALYSIS NONAUTO W/O SCOPE: CPT

## 2023-09-14 RX ORDER — BACILLUS CALMETTE-GUERIN 50 MG/50ML
50 POWDER, FOR SUSPENSION INTRAVESICAL
Refills: 0 | Status: COMPLETED | OUTPATIENT
Start: 2023-09-14

## 2023-09-14 RX ADMIN — BACILLUS CALMETTE-GUERIN 0 MG: 50 POWDER, FOR SUSPENSION INTRAVESICAL at 00:00

## 2023-09-15 ENCOUNTER — NON-APPOINTMENT (OUTPATIENT)
Age: 58
End: 2023-09-15

## 2023-09-19 ENCOUNTER — NON-APPOINTMENT (OUTPATIENT)
Age: 58
End: 2023-09-19

## 2023-09-21 ENCOUNTER — APPOINTMENT (OUTPATIENT)
Dept: UROLOGY | Facility: CLINIC | Age: 58
End: 2023-09-21
Payer: COMMERCIAL

## 2023-09-21 DIAGNOSIS — C67.9 MALIGNANT NEOPLASM OF BLADDER, UNSPECIFIED: ICD-10-CM

## 2023-09-21 LAB
BILIRUBIN URINE: NORMAL
BLOOD URINE: NORMAL
GLUCOSE QUALITATIVE U: NORMAL
KETONES URINE: NORMAL
LEUKOCYTE ESTERASE URINE: NORMAL
NITRITE URINE: NORMAL
PH URINE: 6
PROTEIN URINE: NORMAL
SPECIFIC GRAVITY URINE: 1.03
UROBILINOGEN URINE: 0.2

## 2023-09-21 PROCEDURE — 51720 TREATMENT OF BLADDER LESION: CPT

## 2023-09-21 RX ORDER — BACILLUS CALMETTE-GUERIN 50 MG/50ML
50 POWDER, FOR SUSPENSION INTRAVESICAL
Qty: 1 | Refills: 0 | Status: COMPLETED | COMMUNITY
Start: 2023-08-10 | End: 2023-09-21

## 2023-09-21 RX ORDER — BACILLUS CALMETTE-GUERIN 50 MG/50ML
50 POWDER, FOR SUSPENSION INTRAVESICAL
Refills: 0 | Status: COMPLETED | OUTPATIENT
Start: 2023-09-21

## 2023-09-27 ENCOUNTER — NON-APPOINTMENT (OUTPATIENT)
Age: 58
End: 2023-09-27

## 2023-10-24 ENCOUNTER — OUTPATIENT (OUTPATIENT)
Dept: OUTPATIENT SERVICES | Facility: HOSPITAL | Age: 58
LOS: 1 days | End: 2023-10-24
Payer: COMMERCIAL

## 2023-10-24 VITALS
RESPIRATION RATE: 16 BRPM | WEIGHT: 171.96 LBS | TEMPERATURE: 98 F | DIASTOLIC BLOOD PRESSURE: 73 MMHG | HEART RATE: 71 BPM | OXYGEN SATURATION: 96 % | SYSTOLIC BLOOD PRESSURE: 124 MMHG | HEIGHT: 69 IN

## 2023-10-24 DIAGNOSIS — Z98.890 OTHER SPECIFIED POSTPROCEDURAL STATES: Chronic | ICD-10-CM

## 2023-10-24 DIAGNOSIS — Z01.818 ENCOUNTER FOR OTHER PREPROCEDURAL EXAMINATION: ICD-10-CM

## 2023-10-24 DIAGNOSIS — C67.9 MALIGNANT NEOPLASM OF BLADDER, UNSPECIFIED: ICD-10-CM

## 2023-10-24 DIAGNOSIS — Z96.662 PRESENCE OF LEFT ARTIFICIAL ANKLE JOINT: Chronic | ICD-10-CM

## 2023-10-24 LAB
ANION GAP SERPL CALC-SCNC: 6 MMOL/L — SIGNIFICANT CHANGE UP (ref 5–17)
ANION GAP SERPL CALC-SCNC: 6 MMOL/L — SIGNIFICANT CHANGE UP (ref 5–17)
APPEARANCE UR: CLEAR — SIGNIFICANT CHANGE UP
APPEARANCE UR: CLEAR — SIGNIFICANT CHANGE UP
APTT BLD: 32.1 SEC — SIGNIFICANT CHANGE UP (ref 24.5–35.6)
APTT BLD: 32.1 SEC — SIGNIFICANT CHANGE UP (ref 24.5–35.6)
BACTERIA # UR AUTO: NEGATIVE /HPF — SIGNIFICANT CHANGE UP
BACTERIA # UR AUTO: NEGATIVE /HPF — SIGNIFICANT CHANGE UP
BASOPHILS # BLD AUTO: 0.04 K/UL — SIGNIFICANT CHANGE UP (ref 0–0.2)
BASOPHILS # BLD AUTO: 0.04 K/UL — SIGNIFICANT CHANGE UP (ref 0–0.2)
BASOPHILS NFR BLD AUTO: 0.7 % — SIGNIFICANT CHANGE UP (ref 0–2)
BASOPHILS NFR BLD AUTO: 0.7 % — SIGNIFICANT CHANGE UP (ref 0–2)
BILIRUB UR-MCNC: NEGATIVE — SIGNIFICANT CHANGE UP
BILIRUB UR-MCNC: NEGATIVE — SIGNIFICANT CHANGE UP
BLD GP AB SCN SERPL QL: SIGNIFICANT CHANGE UP
BLD GP AB SCN SERPL QL: SIGNIFICANT CHANGE UP
BUN SERPL-MCNC: 20 MG/DL — SIGNIFICANT CHANGE UP (ref 7–23)
BUN SERPL-MCNC: 20 MG/DL — SIGNIFICANT CHANGE UP (ref 7–23)
CALCIUM SERPL-MCNC: 8.5 MG/DL — SIGNIFICANT CHANGE UP (ref 8.5–10.1)
CALCIUM SERPL-MCNC: 8.5 MG/DL — SIGNIFICANT CHANGE UP (ref 8.5–10.1)
CAST: 4 /LPF — SIGNIFICANT CHANGE UP (ref 0–4)
CAST: 4 /LPF — SIGNIFICANT CHANGE UP (ref 0–4)
CHLORIDE SERPL-SCNC: 108 MMOL/L — SIGNIFICANT CHANGE UP (ref 96–108)
CHLORIDE SERPL-SCNC: 108 MMOL/L — SIGNIFICANT CHANGE UP (ref 96–108)
CO2 SERPL-SCNC: 26 MMOL/L — SIGNIFICANT CHANGE UP (ref 22–31)
CO2 SERPL-SCNC: 26 MMOL/L — SIGNIFICANT CHANGE UP (ref 22–31)
COLOR SPEC: SIGNIFICANT CHANGE UP
COLOR SPEC: SIGNIFICANT CHANGE UP
CREAT SERPL-MCNC: 1.29 MG/DL — SIGNIFICANT CHANGE UP (ref 0.5–1.3)
CREAT SERPL-MCNC: 1.29 MG/DL — SIGNIFICANT CHANGE UP (ref 0.5–1.3)
DIFF PNL FLD: ABNORMAL
DIFF PNL FLD: ABNORMAL
EGFR: 64 ML/MIN/1.73M2 — SIGNIFICANT CHANGE UP
EGFR: 64 ML/MIN/1.73M2 — SIGNIFICANT CHANGE UP
EOSINOPHIL # BLD AUTO: 0.15 K/UL — SIGNIFICANT CHANGE UP (ref 0–0.5)
EOSINOPHIL # BLD AUTO: 0.15 K/UL — SIGNIFICANT CHANGE UP (ref 0–0.5)
EOSINOPHIL NFR BLD AUTO: 2.5 % — SIGNIFICANT CHANGE UP (ref 0–6)
EOSINOPHIL NFR BLD AUTO: 2.5 % — SIGNIFICANT CHANGE UP (ref 0–6)
EPI CELLS # UR: PRESENT
EPI CELLS # UR: PRESENT
GLUCOSE SERPL-MCNC: 108 MG/DL — HIGH (ref 70–99)
GLUCOSE SERPL-MCNC: 108 MG/DL — HIGH (ref 70–99)
GLUCOSE UR QL: NEGATIVE MG/DL — SIGNIFICANT CHANGE UP
GLUCOSE UR QL: NEGATIVE MG/DL — SIGNIFICANT CHANGE UP
HCT VFR BLD CALC: 41.3 % — SIGNIFICANT CHANGE UP (ref 39–50)
HCT VFR BLD CALC: 41.3 % — SIGNIFICANT CHANGE UP (ref 39–50)
HGB BLD-MCNC: 14.4 G/DL — SIGNIFICANT CHANGE UP (ref 13–17)
HGB BLD-MCNC: 14.4 G/DL — SIGNIFICANT CHANGE UP (ref 13–17)
HYALINE CASTS # UR AUTO: PRESENT
HYALINE CASTS # UR AUTO: PRESENT
IMM GRANULOCYTES NFR BLD AUTO: 0.2 % — SIGNIFICANT CHANGE UP (ref 0–0.9)
IMM GRANULOCYTES NFR BLD AUTO: 0.2 % — SIGNIFICANT CHANGE UP (ref 0–0.9)
INR BLD: 0.99 RATIO — SIGNIFICANT CHANGE UP (ref 0.85–1.18)
INR BLD: 0.99 RATIO — SIGNIFICANT CHANGE UP (ref 0.85–1.18)
KETONES UR-MCNC: ABNORMAL MG/DL
KETONES UR-MCNC: ABNORMAL MG/DL
LEUKOCYTE ESTERASE UR-ACNC: ABNORMAL
LEUKOCYTE ESTERASE UR-ACNC: ABNORMAL
LYMPHOCYTES # BLD AUTO: 0.73 K/UL — LOW (ref 1–3.3)
LYMPHOCYTES # BLD AUTO: 0.73 K/UL — LOW (ref 1–3.3)
LYMPHOCYTES # BLD AUTO: 12.2 % — LOW (ref 13–44)
LYMPHOCYTES # BLD AUTO: 12.2 % — LOW (ref 13–44)
MCHC RBC-ENTMCNC: 32.1 PG — SIGNIFICANT CHANGE UP (ref 27–34)
MCHC RBC-ENTMCNC: 32.1 PG — SIGNIFICANT CHANGE UP (ref 27–34)
MCHC RBC-ENTMCNC: 34.9 GM/DL — SIGNIFICANT CHANGE UP (ref 32–36)
MCHC RBC-ENTMCNC: 34.9 GM/DL — SIGNIFICANT CHANGE UP (ref 32–36)
MCV RBC AUTO: 92 FL — SIGNIFICANT CHANGE UP (ref 80–100)
MCV RBC AUTO: 92 FL — SIGNIFICANT CHANGE UP (ref 80–100)
MONOCYTES # BLD AUTO: 0.57 K/UL — SIGNIFICANT CHANGE UP (ref 0–0.9)
MONOCYTES # BLD AUTO: 0.57 K/UL — SIGNIFICANT CHANGE UP (ref 0–0.9)
MONOCYTES NFR BLD AUTO: 9.5 % — SIGNIFICANT CHANGE UP (ref 2–14)
MONOCYTES NFR BLD AUTO: 9.5 % — SIGNIFICANT CHANGE UP (ref 2–14)
NEUTROPHILS # BLD AUTO: 4.48 K/UL — SIGNIFICANT CHANGE UP (ref 1.8–7.4)
NEUTROPHILS # BLD AUTO: 4.48 K/UL — SIGNIFICANT CHANGE UP (ref 1.8–7.4)
NEUTROPHILS NFR BLD AUTO: 74.9 % — SIGNIFICANT CHANGE UP (ref 43–77)
NEUTROPHILS NFR BLD AUTO: 74.9 % — SIGNIFICANT CHANGE UP (ref 43–77)
NITRITE UR-MCNC: NEGATIVE — SIGNIFICANT CHANGE UP
NITRITE UR-MCNC: NEGATIVE — SIGNIFICANT CHANGE UP
PH UR: 5.5 — SIGNIFICANT CHANGE UP (ref 5–8)
PH UR: 5.5 — SIGNIFICANT CHANGE UP (ref 5–8)
PLATELET # BLD AUTO: 243 K/UL — SIGNIFICANT CHANGE UP (ref 150–400)
PLATELET # BLD AUTO: 243 K/UL — SIGNIFICANT CHANGE UP (ref 150–400)
POTASSIUM SERPL-MCNC: 4.1 MMOL/L — SIGNIFICANT CHANGE UP (ref 3.5–5.3)
POTASSIUM SERPL-MCNC: 4.1 MMOL/L — SIGNIFICANT CHANGE UP (ref 3.5–5.3)
POTASSIUM SERPL-SCNC: 4.1 MMOL/L — SIGNIFICANT CHANGE UP (ref 3.5–5.3)
POTASSIUM SERPL-SCNC: 4.1 MMOL/L — SIGNIFICANT CHANGE UP (ref 3.5–5.3)
PROT UR-MCNC: SIGNIFICANT CHANGE UP MG/DL
PROT UR-MCNC: SIGNIFICANT CHANGE UP MG/DL
PROTHROM AB SERPL-ACNC: 11.2 SEC — SIGNIFICANT CHANGE UP (ref 9.5–13)
PROTHROM AB SERPL-ACNC: 11.2 SEC — SIGNIFICANT CHANGE UP (ref 9.5–13)
RBC # BLD: 4.49 M/UL — SIGNIFICANT CHANGE UP (ref 4.2–5.8)
RBC # BLD: 4.49 M/UL — SIGNIFICANT CHANGE UP (ref 4.2–5.8)
RBC # FLD: 12 % — SIGNIFICANT CHANGE UP (ref 10.3–14.5)
RBC # FLD: 12 % — SIGNIFICANT CHANGE UP (ref 10.3–14.5)
RBC CASTS # UR COMP ASSIST: 6 /HPF — HIGH (ref 0–4)
RBC CASTS # UR COMP ASSIST: 6 /HPF — HIGH (ref 0–4)
SODIUM SERPL-SCNC: 140 MMOL/L — SIGNIFICANT CHANGE UP (ref 135–145)
SODIUM SERPL-SCNC: 140 MMOL/L — SIGNIFICANT CHANGE UP (ref 135–145)
SP GR SPEC: 1.03 — SIGNIFICANT CHANGE UP (ref 1–1.03)
SP GR SPEC: 1.03 — SIGNIFICANT CHANGE UP (ref 1–1.03)
SQUAMOUS # UR AUTO: 6 /HPF — HIGH (ref 0–5)
SQUAMOUS # UR AUTO: 6 /HPF — HIGH (ref 0–5)
UROBILINOGEN FLD QL: 1 MG/DL — SIGNIFICANT CHANGE UP (ref 0.2–1)
UROBILINOGEN FLD QL: 1 MG/DL — SIGNIFICANT CHANGE UP (ref 0.2–1)
WBC # BLD: 5.98 K/UL — SIGNIFICANT CHANGE UP (ref 3.8–10.5)
WBC # BLD: 5.98 K/UL — SIGNIFICANT CHANGE UP (ref 3.8–10.5)
WBC # FLD AUTO: 5.98 K/UL — SIGNIFICANT CHANGE UP (ref 3.8–10.5)
WBC # FLD AUTO: 5.98 K/UL — SIGNIFICANT CHANGE UP (ref 3.8–10.5)
WBC UR QL: 12 /HPF — HIGH (ref 0–5)
WBC UR QL: 12 /HPF — HIGH (ref 0–5)

## 2023-10-24 PROCEDURE — 80048 BASIC METABOLIC PNL TOTAL CA: CPT

## 2023-10-24 PROCEDURE — 93005 ELECTROCARDIOGRAM TRACING: CPT

## 2023-10-24 PROCEDURE — 81001 URINALYSIS AUTO W/SCOPE: CPT

## 2023-10-24 PROCEDURE — 36415 COLL VENOUS BLD VENIPUNCTURE: CPT

## 2023-10-24 PROCEDURE — 86850 RBC ANTIBODY SCREEN: CPT

## 2023-10-24 PROCEDURE — 85025 COMPLETE CBC W/AUTO DIFF WBC: CPT

## 2023-10-24 PROCEDURE — 85610 PROTHROMBIN TIME: CPT

## 2023-10-24 PROCEDURE — 93010 ELECTROCARDIOGRAM REPORT: CPT

## 2023-10-24 PROCEDURE — 99214 OFFICE O/P EST MOD 30 MIN: CPT | Mod: 25

## 2023-10-24 PROCEDURE — 87086 URINE CULTURE/COLONY COUNT: CPT

## 2023-10-24 PROCEDURE — 85730 THROMBOPLASTIN TIME PARTIAL: CPT

## 2023-10-24 PROCEDURE — 86901 BLOOD TYPING SEROLOGIC RH(D): CPT

## 2023-10-24 PROCEDURE — 86900 BLOOD TYPING SEROLOGIC ABO: CPT

## 2023-10-24 NOTE — H&P PST ADULT - NSICDXPASTMEDICALHX_GEN_ALL_CORE_FT
PAST MEDICAL HISTORY:  Asthma, chronic     Cancer of bladder     COVID-19 virus infection March2020 , admitted to [facility] 7 days    History of hematuria     Lesion of urinary bladder     Osteoarthritis of left ankle      PAST MEDICAL HISTORY:  Asthma, chronic     Cancer of bladder     COVID-19 virus infection March2020 , admitted to [facility] 7 days    History of hematuria     Lesion of urinary bladder     Osteoarthritis of left ankle     Transient atrial fibrillation

## 2023-10-24 NOTE — H&P PST ADULT - CARDIOVASCULAR COMMENTS
Patient reports an episode of "A-fib" during cystoscopy in July 2023, he followed with Dr Peterson post op, no further episodes, testing revealed no significant findings Left ankle mild edema, non pitting

## 2023-10-24 NOTE — H&P PST ADULT - HISTORY OF PRESENT ILLNESS
[age] y.o WD, WN male presents to PST with hx of bladder cancer . Patient was dx with bladder cancer ~ 3/2023. He had a Cystoscopy, TURBT and 6 weeks of BCG treatment. Patient currently denies dysuria or hematuria. He has followed with urology and scheduled for a Cystoscopy, Bladder Biopsy with Fulguration

## 2023-10-24 NOTE — H&P PST ADULT - ASSESSMENT
[age] y.o male scheduled for a Cystoscopy, Bladder Biopsy with Fulguration   Plan  1. Stop all NSAIDS, herbal supplements and vitamins for 7 days.  2. NPO at midnight.  3. Take the following medications --none--with small sips of water on the morning of your procedure/surgery.  4. Labs, EKG, CXR  as per surgeon  5. PMD MRECED Gee  visit for optimization prior to surgery as per surgeon  6. Preprocedure education provided

## 2023-10-25 DIAGNOSIS — Z01.818 ENCOUNTER FOR OTHER PREPROCEDURAL EXAMINATION: ICD-10-CM

## 2023-10-25 DIAGNOSIS — C67.9 MALIGNANT NEOPLASM OF BLADDER, UNSPECIFIED: ICD-10-CM

## 2023-10-26 LAB
CULTURE RESULTS: NO GROWTH — SIGNIFICANT CHANGE UP
CULTURE RESULTS: NO GROWTH — SIGNIFICANT CHANGE UP
SPECIMEN SOURCE: SIGNIFICANT CHANGE UP
SPECIMEN SOURCE: SIGNIFICANT CHANGE UP

## 2023-11-01 ENCOUNTER — APPOINTMENT (OUTPATIENT)
Dept: UROLOGY | Facility: CLINIC | Age: 58
End: 2023-11-01

## 2023-11-02 ENCOUNTER — APPOINTMENT (OUTPATIENT)
Dept: UROLOGY | Facility: HOSPITAL | Age: 58
End: 2023-11-02

## 2023-11-06 PROBLEM — I48.91 UNSPECIFIED ATRIAL FIBRILLATION: Chronic | Status: ACTIVE | Noted: 2023-10-24

## 2023-11-06 PROBLEM — J45.909 UNSPECIFIED ASTHMA, UNCOMPLICATED: Chronic | Status: ACTIVE | Noted: 2023-10-24

## 2023-11-15 ENCOUNTER — APPOINTMENT (OUTPATIENT)
Dept: UROLOGY | Facility: CLINIC | Age: 58
End: 2023-11-15

## 2023-12-12 ENCOUNTER — OUTPATIENT (OUTPATIENT)
Dept: INPATIENT UNIT | Facility: HOSPITAL | Age: 58
LOS: 1 days | Discharge: HOME | End: 2023-12-12
Payer: COMMERCIAL

## 2023-12-12 ENCOUNTER — APPOINTMENT (OUTPATIENT)
Dept: UROLOGY | Facility: HOSPITAL | Age: 58
End: 2023-12-12

## 2023-12-12 ENCOUNTER — TRANSCRIPTION ENCOUNTER (OUTPATIENT)
Age: 58
End: 2023-12-12

## 2023-12-12 ENCOUNTER — RESULT REVIEW (OUTPATIENT)
Age: 58
End: 2023-12-12

## 2023-12-12 VITALS
RESPIRATION RATE: 16 BRPM | DIASTOLIC BLOOD PRESSURE: 84 MMHG | HEIGHT: 69 IN | TEMPERATURE: 98 F | OXYGEN SATURATION: 100 % | WEIGHT: 171.96 LBS | SYSTOLIC BLOOD PRESSURE: 130 MMHG | HEART RATE: 56 BPM

## 2023-12-12 VITALS
SYSTOLIC BLOOD PRESSURE: 125 MMHG | OXYGEN SATURATION: 98 % | DIASTOLIC BLOOD PRESSURE: 87 MMHG | RESPIRATION RATE: 16 BRPM | TEMPERATURE: 98 F | HEART RATE: 55 BPM

## 2023-12-12 DIAGNOSIS — Z98.890 OTHER SPECIFIED POSTPROCEDURAL STATES: Chronic | ICD-10-CM

## 2023-12-12 DIAGNOSIS — C67.9 MALIGNANT NEOPLASM OF BLADDER, UNSPECIFIED: ICD-10-CM

## 2023-12-12 DIAGNOSIS — Z96.662 PRESENCE OF LEFT ARTIFICIAL ANKLE JOINT: Chronic | ICD-10-CM

## 2023-12-12 LAB
BLD GP AB SCN SERPL QL: SIGNIFICANT CHANGE UP
BLD GP AB SCN SERPL QL: SIGNIFICANT CHANGE UP

## 2023-12-12 PROCEDURE — 88104 CYTOPATH FL NONGYN SMEARS: CPT

## 2023-12-12 PROCEDURE — 36415 COLL VENOUS BLD VENIPUNCTURE: CPT

## 2023-12-12 PROCEDURE — 86901 BLOOD TYPING SEROLOGIC RH(D): CPT

## 2023-12-12 PROCEDURE — 86900 BLOOD TYPING SEROLOGIC ABO: CPT

## 2023-12-12 PROCEDURE — 86850 RBC ANTIBODY SCREEN: CPT

## 2023-12-12 PROCEDURE — 52204 CYSTOSCOPY W/BIOPSY(S): CPT

## 2023-12-12 PROCEDURE — 88305 TISSUE EXAM BY PATHOLOGIST: CPT

## 2023-12-12 PROCEDURE — 88104 CYTOPATH FL NONGYN SMEARS: CPT | Mod: 26

## 2023-12-12 PROCEDURE — 88305 TISSUE EXAM BY PATHOLOGIST: CPT | Mod: 26

## 2023-12-12 RX ORDER — FENTANYL CITRATE 50 UG/ML
25 INJECTION INTRAVENOUS
Refills: 0 | Status: DISCONTINUED | OUTPATIENT
Start: 2023-12-12 | End: 2023-12-12

## 2023-12-12 RX ORDER — IBUPROFEN 200 MG
400 TABLET ORAL ONCE
Refills: 0 | Status: COMPLETED | OUTPATIENT
Start: 2023-12-12 | End: 2023-12-12

## 2023-12-12 RX ORDER — OXYCODONE HYDROCHLORIDE 5 MG/1
1 TABLET ORAL
Qty: 10 | Refills: 0
Start: 2023-12-12

## 2023-12-12 RX ORDER — OXYBUTYNIN CHLORIDE 5 MG
1 TABLET ORAL
Refills: 0 | DISCHARGE

## 2023-12-12 RX ORDER — SODIUM CHLORIDE 9 MG/ML
1000 INJECTION, SOLUTION INTRAVENOUS
Refills: 0 | Status: DISCONTINUED | OUTPATIENT
Start: 2023-12-12 | End: 2023-12-12

## 2023-12-12 RX ORDER — MIRABEGRON 50 MG/1
1 TABLET, EXTENDED RELEASE ORAL
Refills: 0 | DISCHARGE

## 2023-12-12 RX ORDER — OXYCODONE HYDROCHLORIDE 5 MG/1
1 TABLET ORAL
Qty: 10 | Refills: 0 | DISCHARGE
Start: 2023-12-12

## 2023-12-12 RX ORDER — ONDANSETRON 8 MG/1
4 TABLET, FILM COATED ORAL
Refills: 0 | Status: DISCONTINUED | OUTPATIENT
Start: 2023-12-12 | End: 2023-12-12

## 2023-12-12 RX ORDER — PHENAZOPYRIDINE HCL 100 MG
1 TABLET ORAL
Refills: 0 | DISCHARGE

## 2023-12-12 RX ORDER — NITROFURANTOIN MACROCRYSTAL 50 MG
1 CAPSULE ORAL
Refills: 0 | DISCHARGE

## 2023-12-12 RX ORDER — TADALAFIL 10 MG/1
1 TABLET, FILM COATED ORAL
Refills: 0 | DISCHARGE

## 2023-12-12 RX ADMIN — Medication 400 MILLIGRAM(S): at 14:40

## 2023-12-12 RX ADMIN — Medication 400 MILLIGRAM(S): at 14:31

## 2023-12-12 NOTE — ASU DISCHARGE PLAN (ADULT/PEDIATRIC) - NURSING INSTRUCTIONS
[facility] Daily Progress     Patient Name: Jaylan Alberto  PRECAUTIONS/RESTRICTIONS:  Atrial Fibrillation, HTN, Knee OA, bilateral shoulder restrictions  Date: 3/16/2018  Visit #: 6  PTA visit #:  0  Referral Diagnosis: Chronic Left Hip Pain  Referring provider: Yosef Mason MD  Visit Diagnosis:     ICD-10-CM    1. Chronic left hip pain M25.552     G89.29    2. Muscle weakness (generalized) M62.81    3. Decreased ROM of lower extremity M25.669          Assessment:     HEP/POC compliance is  good performing 15 reps per exercise 5-6 days/week;rides bike 3x/week.  Patient demonstrates understanding/independence with home program.  Response to Intervention fair with generally greater ease and less pain with left SL for sleep, walking and rolling over in bed  Patient is benefitting from skilled physical therapy and is making steady progress toward functional goals.  Patient is appropriate to continue with skilled physical therapy intervention, as indicated by initial plan of care.   Possible trochanteric bursitis along with Hip OA  LEFS Outcome Measure did not show a statistically significant change.    Goal Status:    Pt. will demonstrate/verbalize independence in self-management of condition in : Met  Pt. will be independent with home exercise program in : Met  Pt. will have improved quality of sleep: Progressing toward (SL tolerance for sleep 1-2 hours and is able to get to sleep)  Pt. will be able to walk : Progressing toward  Patient will increase : Not Met (Initial score 59/80  current 58/80)  Comment: current score:  60/80-basically unchanged  Pt will: be able to golf with less pain in 12 weeks.    Plan / Patient Education:     Order from Dr. Mason to continue up to 12 visits for chronic hip pain/bursitis.  Plan to continue weekly for 2 more visits then reassess needs and progress. Continue ITB STM, MFR to left iliopsoas, contract/relax HS stretch review all exercises,     Subjective:     Pain  "Rating: Left hip pain 1-2 left knee 2-3/10.  Noting stiffness at anterior hip joint but feels deep.  Stiffness>pain  Overall better with less pain and increased function for walking and left SL sleep tolerance.  Had cortisone injection to left knee and was better but noted increased discomfort while on vacation with dryer weather and more frequent stair climbing finding descent leading with right more painful on left knee.  Able to walk 20-30 min daily and rolling over in bed is easier  He continues to use Tylenol and Motrin and analgesic cream for pain modulation.  Compliant with HEP 5-6 days /week and has not used his cane since last seen.   Stand tolerance 30 minutes, sit tolerance 2-3 hours, walk tolerance 60 minutes, sleep interruption 1-2x/night.  He finds that warming up on the bike prior to exercises is helpful in making exercises more comfortable.    Walking tolerance 15 minutes.  Left knee bothers more than hip and likely aggravated with much stair climbing while on trip recently.  Pain/difficulty with sit to standing due to left knee pain primarily..  Hip exercises are helpful and feels more stiff>pain.  Able to tolerate left SL for sleep in regards to left hip pain.    Response to PT/benefits: Hip feels stronger and LE feels more flexible from the exercises.  He has greater tolerance for left SL at sleep and performs car transfers with more normal entry. He can walk 15 minutes(<1 mile) up to 30 minutes(> 1 mile) but can experience some pain.  Biking is not problematic.        Continued difficulties: Stair climbing 1 flight especially down and crossing left foot over right knee and rising from a low chair.    \"PT is helping.\"  LEFS Outcome Measure:  Initial 61/80  Current 58/80    Objective:     HS tightness right>left.  Palpation: Post trochanteric and distal ITB to below knee.  Gait:  Antalgic with left + Trendelenburg  Hip ROM:  Able to cross ankles without pain           Date: 12/1/2017 1/26/2018  " " 3/2/2018   Hip ROM( ) AROM in degrees AROM in degrees AROM in degrees     Right Left Right Left Right Left   Hip Flexion (0-120 ) 114 106 with pain   112     110 mild twinge   Hip Abduction (0-45 ) 35 easier 30    33 end range pain   35 stretch    Hip External Rotation (0-50 ) 40 35   35    35    Hip Internal Rotation (0-40 ) 15 stiff 5 stiff    5 stiff    5   Hip Extension (0-15 )                 Left Knee ROM 0-130o with stretch      Today's Exercises:  No new exercises as is doing exercises to address both hip ROM and hip/knee strength.  Tolerated treatment well.    Treatment Today    TREATMENT MINUTES COMMENTS   Evaluation     Self-care/ Home management  Discussed status since last session   Manual therapy 18 Supine:  MFR left iliopsoas; at right SL:  STM ITB to trochanter.   Neuromuscular Re-education 5 Contract relax for facilitation of improved HS length, 3x8\" each   Therapeutic Activity     Therapeutic Exercises 3  Brief verbal review of HEP. And reminding patient to resume closed chain HS; issued purple band.   Gait training     Modality__________________     Hip ROM           Total 26    Blank areas are intentional and mean the treatment did not include these items.       Elizabeth Amador  3/16/2018    " Begin with liquids and light food ( tea, toast, Jello, soups). Advance to what you normally eat. Liquids should taken in adequate amounts today.     CALL the DOCTOR:    -Fever greater than  101F  - Signs  of infection such as : increase pain,swelling,redness,or a bad  smell coming from the wound.  -Excessive amount of bleeding.  - Any pain that appears to be getting worse.  - Vomiting  -  If you have  not urinated 8 hours after surgery or have any difficulty urinating.     A responsible adult should be with you for the rest of the day and night for your safety and to help you if you needed.    Review attached FACT SHEET if applicable.

## 2023-12-12 NOTE — ASU DISCHARGE PLAN (ADULT/PEDIATRIC) - NS MD DC FALL RISK RISK
For information on Fall & Injury Prevention, visit: [email or web address] OR  [email or web address] OR  https://www.cdc.gov/steadi/patient.html For information on Fall & Injury Prevention, visit: [email or web address] OR  [email or web address]-[email or web address]-[email or web address]-injury OR  https://www.cdc.gov/steadi/patient.html

## 2023-12-12 NOTE — ASU PATIENT PROFILE, ADULT - NSICDXPASTMEDICALHX_GEN_ALL_CORE_FT
PAST MEDICAL HISTORY:  Anemia     Asthma, chronic     Cancer of bladder     COVID-19 virus infection March2020 , admitted to [facility] 7 days    History of hematuria     HTN (hypertension)     Lesion of urinary bladder     Osteoarthritis of left ankle     Paroxysmal atrial fibrillation     Transient atrial fibrillation      PAST MEDICAL HISTORY:  Anemia     Asthma, chronic     Cancer of bladder     COVID-19 virus infection March2020 , admitted to [facility] 7 days    History of hematuria     HTN (hypertension)     Lesion of urinary bladder     Osteoarthritis of left ankle     Paroxysmal atrial fibrillation     Transient atrial fibrillation

## 2023-12-12 NOTE — BRIEF OPERATIVE NOTE - OPERATION/FINDINGS
cystourethroscopy: obstructive bilobar prostatic hyperplasia.   erythema, posterior bladder wall, biopsied and extensively fulgurated  additional random biopsies taken of the right lateral wall, left lateral wall, posterior dome   all biopsy areas fulgurated with hemostasis attained

## 2023-12-12 NOTE — ASU PATIENT PROFILE, ADULT - FALL HARM RISK - UNIVERSAL INTERVENTIONS
Bed in lowest position, wheels locked, appropriate side rails in place/Call bell, personal items and telephone in reach/Instruct patient to call for assistance before getting out of bed or chair/Non-slip footwear when patient is out of bed/[place] to call system/Physically safe environment - no spills, clutter or unnecessary equipment/Purposeful Proactive Rounding/Room/bathroom lighting operational, light cord in reach Bed in lowest position, wheels locked, appropriate side rails in place/Call bell, personal items and telephone in reach/Instruct patient to call for assistance before getting out of bed or chair/Non-slip footwear when patient is out of bed/[place] to call system/Physically safe environment - no spills, clutter or unnecessary equipment/Purposeful Proactive Rounding/Room/bathroom lighting operational, light cord in reach

## 2023-12-18 LAB
SURGICAL PATHOLOGY STUDY: SIGNIFICANT CHANGE UP
SURGICAL PATHOLOGY STUDY: SIGNIFICANT CHANGE UP

## 2023-12-20 DIAGNOSIS — Z85.51 PERSONAL HISTORY OF MALIGNANT NEOPLASM OF BLADDER: ICD-10-CM

## 2023-12-20 DIAGNOSIS — N30.80 OTHER CYSTITIS WITHOUT HEMATURIA: ICD-10-CM

## 2023-12-20 DIAGNOSIS — Z79.01 LONG TERM (CURRENT) USE OF ANTICOAGULANTS: ICD-10-CM

## 2023-12-20 DIAGNOSIS — C67.9 MALIGNANT NEOPLASM OF BLADDER, UNSPECIFIED: ICD-10-CM

## 2023-12-20 DIAGNOSIS — I48.91 UNSPECIFIED ATRIAL FIBRILLATION: ICD-10-CM

## 2023-12-20 DIAGNOSIS — M19.072 PRIMARY OSTEOARTHRITIS, LEFT ANKLE AND FOOT: ICD-10-CM

## 2023-12-20 DIAGNOSIS — Z96.652 PRESENCE OF LEFT ARTIFICIAL KNEE JOINT: ICD-10-CM

## 2023-12-27 ENCOUNTER — APPOINTMENT (OUTPATIENT)
Dept: UROLOGY | Facility: CLINIC | Age: 58
End: 2023-12-27
Payer: COMMERCIAL

## 2023-12-27 PROCEDURE — 99213 OFFICE O/P EST LOW 20 MIN: CPT

## 2023-12-29 ENCOUNTER — NON-APPOINTMENT (OUTPATIENT)
Age: 58
End: 2023-12-29

## 2023-12-29 NOTE — HISTORY OF PRESENT ILLNESS
[FreeTextEntry1] : Here to discuss results of bladder biopsy All negative and some inflammation in bladder

## 2023-12-29 NOTE — ASSESSMENT
[FreeTextEntry1] : Discussed results in detail No cancer on these biopsies  but high grade prior to BCG Needs maintenance BCG discussed in detail

## 2024-01-03 RX ORDER — MITOMYCIN 40 MG/80ML
40 INJECTION, POWDER, LYOPHILIZED, FOR SOLUTION INTRAVENOUS
Qty: 1 | Refills: 2 | Status: ACTIVE | COMMUNITY
Start: 2024-01-03 | End: 1900-01-01

## 2024-01-05 ENCOUNTER — NON-APPOINTMENT (OUTPATIENT)
Age: 59
End: 2024-01-05

## 2024-01-05 PROBLEM — I10 ESSENTIAL (PRIMARY) HYPERTENSION: Chronic | Status: ACTIVE | Noted: 2023-12-11

## 2024-01-05 PROBLEM — D64.9 ANEMIA, UNSPECIFIED: Chronic | Status: ACTIVE | Noted: 2023-12-11

## 2024-01-24 ENCOUNTER — NON-APPOINTMENT (OUTPATIENT)
Age: 59
End: 2024-01-24

## 2024-01-24 ENCOUNTER — APPOINTMENT (OUTPATIENT)
Dept: UROLOGY | Facility: CLINIC | Age: 59
End: 2024-01-24
Payer: COMMERCIAL

## 2024-01-24 LAB
BILIRUB UR QL STRIP: NORMAL
GLUCOSE UR-MCNC: NORMAL
HCG UR QL: 0.2 EU/DL
HGB UR QL STRIP.AUTO: ABNORMAL
KETONES UR-MCNC: NORMAL
LEUKOCYTE ESTERASE UR QL STRIP: ABNORMAL
NITRITE UR QL STRIP: NORMAL
PH UR STRIP: 5.5
PROT UR STRIP-MCNC: NORMAL
SP GR UR STRIP: 1.02

## 2024-01-24 RX ORDER — MITOMYCIN 40 MG/80ML
40 INJECTION, POWDER, LYOPHILIZED, FOR SOLUTION INTRAVENOUS
Qty: 1 | Refills: 0 | Status: COMPLETED | OUTPATIENT
Start: 2024-01-24

## 2024-01-24 RX ADMIN — MITOMYCIN 0 MG: 40 INJECTION, POWDER, LYOPHILIZED, FOR SOLUTION INTRAVENOUS at 00:00

## 2024-01-31 ENCOUNTER — APPOINTMENT (OUTPATIENT)
Dept: UROLOGY | Facility: CLINIC | Age: 59
End: 2024-01-31
Payer: COMMERCIAL

## 2024-01-31 PROCEDURE — 81003 URINALYSIS AUTO W/O SCOPE: CPT | Mod: QW

## 2024-01-31 PROCEDURE — 51720 TREATMENT OF BLADDER LESION: CPT

## 2024-01-31 RX ORDER — MITOMYCIN 40 MG/80ML
40 INJECTION, POWDER, LYOPHILIZED, FOR SOLUTION INTRAVENOUS
Qty: 1 | Refills: 0 | Status: COMPLETED | OUTPATIENT
Start: 2024-01-31

## 2024-01-31 RX ADMIN — MITOMYCIN 0 MG: 40 INJECTION, POWDER, LYOPHILIZED, FOR SOLUTION INTRAVENOUS at 00:00

## 2024-02-07 ENCOUNTER — APPOINTMENT (OUTPATIENT)
Dept: UROLOGY | Facility: CLINIC | Age: 59
End: 2024-02-07
Payer: COMMERCIAL

## 2024-02-07 PROCEDURE — 81003 URINALYSIS AUTO W/O SCOPE: CPT | Mod: QW

## 2024-02-07 RX ORDER — MITOMYCIN 40 MG/80ML
40 INJECTION, POWDER, LYOPHILIZED, FOR SOLUTION INTRAVENOUS
Qty: 1 | Refills: 0 | Status: COMPLETED | OUTPATIENT
Start: 2024-02-07

## 2024-02-07 RX ADMIN — MITOMYCIN 0 MG: 40 INJECTION, POWDER, LYOPHILIZED, FOR SOLUTION INTRAVENOUS at 00:00

## 2024-02-08 LAB
BILIRUB UR QL STRIP: NEGATIVE
GLUCOSE UR-MCNC: NEGATIVE
HCG UR QL: 0.2 EU/DL
HGB UR QL STRIP.AUTO: NEGATIVE
KETONES UR-MCNC: NEGATIVE
LEUKOCYTE ESTERASE UR QL STRIP: NEGATIVE
NITRITE UR QL STRIP: NEGATIVE
PH UR STRIP: 5.5
PROT UR STRIP-MCNC: NEGATIVE
SP GR UR STRIP: 1.03

## 2024-03-20 NOTE — ED PROVIDER NOTE - TEMPLATE, MLM
NSR with some PVCs on monitor   BB increased    Suicidal behavior with attempted self-injury Abnormal LFTs (liver function tests) General SURINDER (acute kidney injury)

## 2024-04-02 NOTE — PRE-OP CHECKLIST - AS BP NONINV METHOD
Quality 431: Preventive Care And Screening: Unhealthy Alcohol Use - Screening: Patient identified as an unhealthy alcohol user when screened for unhealthy alcohol use using a systematic screening method and received brief counseling Quality 226: Preventive Care And Screening: Tobacco Use: Screening And Cessation Intervention: Patient screened for tobacco use and is an ex/non-smoker Detail Level: Detailed Quality 130: Documentation Of Current Medications In The Medical Record: Current Medications Documented electronic

## 2024-05-29 ENCOUNTER — APPOINTMENT (OUTPATIENT)
Dept: UROLOGY | Facility: CLINIC | Age: 59
End: 2024-05-29
Payer: COMMERCIAL

## 2024-05-29 LAB
BILIRUB UR QL STRIP: NORMAL
CLARITY UR: CLEAR
COLLECTION METHOD: NORMAL
GLUCOSE UR-MCNC: NORMAL
HCG UR QL: 0.2 EU/DL
HGB UR QL STRIP.AUTO: NORMAL
KETONES UR-MCNC: NORMAL
LEUKOCYTE ESTERASE UR QL STRIP: NORMAL
NITRITE UR QL STRIP: NORMAL
PH UR STRIP: 7
PROT UR STRIP-MCNC: NORMAL
SP GR UR STRIP: 1.02

## 2024-05-29 PROCEDURE — 81003 URINALYSIS AUTO W/O SCOPE: CPT | Mod: QW

## 2024-05-29 PROCEDURE — 52000 CYSTOURETHROSCOPY: CPT

## 2024-06-03 LAB — URINE CYTOLOGY: NORMAL

## 2024-06-05 ENCOUNTER — OUTPATIENT (OUTPATIENT)
Dept: OUTPATIENT SERVICES | Facility: HOSPITAL | Age: 59
LOS: 1 days | End: 2024-06-05
Payer: COMMERCIAL

## 2024-06-05 ENCOUNTER — RESULT REVIEW (OUTPATIENT)
Age: 59
End: 2024-06-05

## 2024-06-05 VITALS
WEIGHT: 177.03 LBS | DIASTOLIC BLOOD PRESSURE: 84 MMHG | RESPIRATION RATE: 14 BRPM | SYSTOLIC BLOOD PRESSURE: 122 MMHG | TEMPERATURE: 98 F | HEART RATE: 51 BPM | OXYGEN SATURATION: 100 % | HEIGHT: 69 IN

## 2024-06-05 DIAGNOSIS — Z01.818 ENCOUNTER FOR OTHER PREPROCEDURAL EXAMINATION: ICD-10-CM

## 2024-06-05 DIAGNOSIS — C67.9 MALIGNANT NEOPLASM OF BLADDER, UNSPECIFIED: ICD-10-CM

## 2024-06-05 DIAGNOSIS — Z96.662 PRESENCE OF LEFT ARTIFICIAL ANKLE JOINT: Chronic | ICD-10-CM

## 2024-06-05 DIAGNOSIS — Z98.890 OTHER SPECIFIED POSTPROCEDURAL STATES: Chronic | ICD-10-CM

## 2024-06-05 LAB
ANION GAP SERPL CALC-SCNC: 2 MMOL/L — LOW (ref 5–17)
APPEARANCE UR: CLEAR — SIGNIFICANT CHANGE UP
APTT BLD: 33.6 SEC — SIGNIFICANT CHANGE UP (ref 24.5–35.6)
BASOPHILS # BLD AUTO: 0.05 K/UL — SIGNIFICANT CHANGE UP (ref 0–0.2)
BASOPHILS NFR BLD AUTO: 0.6 % — SIGNIFICANT CHANGE UP (ref 0–2)
BILIRUB UR-MCNC: NEGATIVE — SIGNIFICANT CHANGE UP
BLD GP AB SCN SERPL QL: SIGNIFICANT CHANGE UP
BUN SERPL-MCNC: 31 MG/DL — HIGH (ref 7–23)
CALCIUM SERPL-MCNC: 9.1 MG/DL — SIGNIFICANT CHANGE UP (ref 8.5–10.1)
CHLORIDE SERPL-SCNC: 114 MMOL/L — HIGH (ref 96–108)
CO2 SERPL-SCNC: 26 MMOL/L — SIGNIFICANT CHANGE UP (ref 22–31)
COLOR SPEC: YELLOW — SIGNIFICANT CHANGE UP
CREAT SERPL-MCNC: 1.08 MG/DL — SIGNIFICANT CHANGE UP (ref 0.5–1.3)
DIFF PNL FLD: NEGATIVE — SIGNIFICANT CHANGE UP
EGFR: 79 ML/MIN/1.73M2 — SIGNIFICANT CHANGE UP
EOSINOPHIL # BLD AUTO: 0.13 K/UL — SIGNIFICANT CHANGE UP (ref 0–0.5)
EOSINOPHIL NFR BLD AUTO: 1.5 % — SIGNIFICANT CHANGE UP (ref 0–6)
GLUCOSE SERPL-MCNC: 83 MG/DL — SIGNIFICANT CHANGE UP (ref 70–99)
GLUCOSE UR QL: NEGATIVE MG/DL — SIGNIFICANT CHANGE UP
HCT VFR BLD CALC: 42.5 % — SIGNIFICANT CHANGE UP (ref 39–50)
HGB BLD-MCNC: 14.7 G/DL — SIGNIFICANT CHANGE UP (ref 13–17)
IMM GRANULOCYTES NFR BLD AUTO: 0.2 % — SIGNIFICANT CHANGE UP (ref 0–0.9)
INR BLD: 0.93 RATIO — SIGNIFICANT CHANGE UP (ref 0.85–1.18)
KETONES UR-MCNC: NEGATIVE MG/DL — SIGNIFICANT CHANGE UP
LEUKOCYTE ESTERASE UR-ACNC: NEGATIVE — SIGNIFICANT CHANGE UP
LYMPHOCYTES # BLD AUTO: 1.08 K/UL — SIGNIFICANT CHANGE UP (ref 1–3.3)
LYMPHOCYTES # BLD AUTO: 12.1 % — LOW (ref 13–44)
MCHC RBC-ENTMCNC: 32.4 PG — SIGNIFICANT CHANGE UP (ref 27–34)
MCHC RBC-ENTMCNC: 34.6 GM/DL — SIGNIFICANT CHANGE UP (ref 32–36)
MCV RBC AUTO: 93.6 FL — SIGNIFICANT CHANGE UP (ref 80–100)
MONOCYTES # BLD AUTO: 0.53 K/UL — SIGNIFICANT CHANGE UP (ref 0–0.9)
MONOCYTES NFR BLD AUTO: 6 % — SIGNIFICANT CHANGE UP (ref 2–14)
NEUTROPHILS # BLD AUTO: 7.09 K/UL — SIGNIFICANT CHANGE UP (ref 1.8–7.4)
NEUTROPHILS NFR BLD AUTO: 79.6 % — HIGH (ref 43–77)
NITRITE UR-MCNC: NEGATIVE — SIGNIFICANT CHANGE UP
PH UR: 5 — SIGNIFICANT CHANGE UP (ref 5–8)
PLATELET # BLD AUTO: 316 K/UL — SIGNIFICANT CHANGE UP (ref 150–400)
POTASSIUM SERPL-MCNC: 4.5 MMOL/L — SIGNIFICANT CHANGE UP (ref 3.5–5.3)
POTASSIUM SERPL-SCNC: 4.5 MMOL/L — SIGNIFICANT CHANGE UP (ref 3.5–5.3)
PROT UR-MCNC: NEGATIVE MG/DL — SIGNIFICANT CHANGE UP
PROTHROM AB SERPL-ACNC: 10.5 SEC — SIGNIFICANT CHANGE UP (ref 9.5–13)
RBC # BLD: 4.54 M/UL — SIGNIFICANT CHANGE UP (ref 4.2–5.8)
RBC # FLD: 12.1 % — SIGNIFICANT CHANGE UP (ref 10.3–14.5)
SODIUM SERPL-SCNC: 142 MMOL/L — SIGNIFICANT CHANGE UP (ref 135–145)
SP GR SPEC: 1.02 — SIGNIFICANT CHANGE UP (ref 1–1.03)
UROBILINOGEN FLD QL: 0.2 MG/DL — SIGNIFICANT CHANGE UP (ref 0.2–1)
WBC # BLD: 8.9 K/UL — SIGNIFICANT CHANGE UP (ref 3.8–10.5)
WBC # FLD AUTO: 8.9 K/UL — SIGNIFICANT CHANGE UP (ref 3.8–10.5)

## 2024-06-05 PROCEDURE — 93010 ELECTROCARDIOGRAM REPORT: CPT

## 2024-06-05 PROCEDURE — 36415 COLL VENOUS BLD VENIPUNCTURE: CPT

## 2024-06-05 PROCEDURE — 86901 BLOOD TYPING SEROLOGIC RH(D): CPT

## 2024-06-05 PROCEDURE — 85610 PROTHROMBIN TIME: CPT

## 2024-06-05 PROCEDURE — 81003 URINALYSIS AUTO W/O SCOPE: CPT

## 2024-06-05 PROCEDURE — 86900 BLOOD TYPING SEROLOGIC ABO: CPT

## 2024-06-05 PROCEDURE — 93005 ELECTROCARDIOGRAM TRACING: CPT

## 2024-06-05 PROCEDURE — 80048 BASIC METABOLIC PNL TOTAL CA: CPT

## 2024-06-05 PROCEDURE — 87086 URINE CULTURE/COLONY COUNT: CPT

## 2024-06-05 PROCEDURE — 86850 RBC ANTIBODY SCREEN: CPT

## 2024-06-05 PROCEDURE — 85730 THROMBOPLASTIN TIME PARTIAL: CPT

## 2024-06-05 PROCEDURE — 71046 X-RAY EXAM CHEST 2 VIEWS: CPT | Mod: 26

## 2024-06-05 PROCEDURE — 85025 COMPLETE CBC W/AUTO DIFF WBC: CPT

## 2024-06-05 PROCEDURE — 99214 OFFICE O/P EST MOD 30 MIN: CPT | Mod: 25

## 2024-06-05 PROCEDURE — 71046 X-RAY EXAM CHEST 2 VIEWS: CPT

## 2024-06-05 NOTE — H&P PST ADULT - HISTORY OF PRESENT ILLNESS
60 y/o male with hx of bladder cancer presents to [facility] for scheduled cystoscopy bladder biopsy 6/13/24. Patient reports during routine follow up lesion noted in the bladder and biopsy required.

## 2024-06-05 NOTE — H&P PST ADULT - ASSESSMENT
58 y/o male with hx of bladder cancer presents to [facility] for scheduled cystoscopy bladder biopsy 6/13/24. Patient reports during routine follow up lesion noted in the bladder and biopsy required.

## 2024-06-05 NOTE — H&P PST ADULT - NSICDXPASTMEDICALHX_GEN_ALL_CORE_FT
PAST MEDICAL HISTORY:  Anemia     Asthma, chronic     Cancer of bladder     COVID-19 virus infection March2020 , [facility]    History of hematuria     HTN (hypertension)     Lesion of urinary bladder     Osteoarthritis of left ankle     Transient atrial fibrillation

## 2024-06-06 DIAGNOSIS — Z01.818 ENCOUNTER FOR OTHER PREPROCEDURAL EXAMINATION: ICD-10-CM

## 2024-06-06 LAB
CULTURE RESULTS: SIGNIFICANT CHANGE UP
SPECIMEN SOURCE: SIGNIFICANT CHANGE UP

## 2024-06-13 ENCOUNTER — OUTPATIENT (OUTPATIENT)
Dept: INPATIENT UNIT | Facility: HOSPITAL | Age: 59
LOS: 1 days | Discharge: HOME | End: 2024-06-13
Payer: COMMERCIAL

## 2024-06-13 ENCOUNTER — RESULT REVIEW (OUTPATIENT)
Age: 59
End: 2024-06-13

## 2024-06-13 ENCOUNTER — APPOINTMENT (OUTPATIENT)
Dept: UROLOGY | Facility: HOSPITAL | Age: 59
End: 2024-06-13

## 2024-06-13 ENCOUNTER — TRANSCRIPTION ENCOUNTER (OUTPATIENT)
Age: 59
End: 2024-06-13

## 2024-06-13 VITALS
HEART RATE: 54 BPM | SYSTOLIC BLOOD PRESSURE: 132 MMHG | WEIGHT: 169.98 LBS | HEIGHT: 69 IN | TEMPERATURE: 98 F | DIASTOLIC BLOOD PRESSURE: 89 MMHG | RESPIRATION RATE: 16 BRPM | OXYGEN SATURATION: 98 %

## 2024-06-13 VITALS
HEART RATE: 63 BPM | DIASTOLIC BLOOD PRESSURE: 92 MMHG | RESPIRATION RATE: 16 BRPM | SYSTOLIC BLOOD PRESSURE: 152 MMHG | OXYGEN SATURATION: 100 % | TEMPERATURE: 97 F

## 2024-06-13 DIAGNOSIS — Z08 ENCOUNTER FOR FOLLOW-UP EXAMINATION AFTER COMPLETED TREATMENT FOR MALIGNANT NEOPLASM: ICD-10-CM

## 2024-06-13 DIAGNOSIS — Z85.51 PERSONAL HISTORY OF MALIGNANT NEOPLASM OF BLADDER: ICD-10-CM

## 2024-06-13 DIAGNOSIS — Z98.890 OTHER SPECIFIED POSTPROCEDURAL STATES: Chronic | ICD-10-CM

## 2024-06-13 DIAGNOSIS — N32.89 OTHER SPECIFIED DISORDERS OF BLADDER: ICD-10-CM

## 2024-06-13 DIAGNOSIS — Z86.16 PERSONAL HISTORY OF COVID-19: ICD-10-CM

## 2024-06-13 DIAGNOSIS — I10 ESSENTIAL (PRIMARY) HYPERTENSION: ICD-10-CM

## 2024-06-13 DIAGNOSIS — N30.20 OTHER CHRONIC CYSTITIS WITHOUT HEMATURIA: ICD-10-CM

## 2024-06-13 DIAGNOSIS — Z96.662 PRESENCE OF LEFT ARTIFICIAL ANKLE JOINT: Chronic | ICD-10-CM

## 2024-06-13 DIAGNOSIS — C67.9 MALIGNANT NEOPLASM OF BLADDER, UNSPECIFIED: ICD-10-CM

## 2024-06-13 PROCEDURE — 52204 CYSTOSCOPY W/BIOPSY(S): CPT

## 2024-06-13 PROCEDURE — 88305 TISSUE EXAM BY PATHOLOGIST: CPT | Mod: 26

## 2024-06-13 PROCEDURE — 88305 TISSUE EXAM BY PATHOLOGIST: CPT

## 2024-06-13 RX ORDER — IBUPROFEN 200 MG
1 TABLET ORAL
Refills: 0 | DISCHARGE

## 2024-06-13 RX ORDER — FENTANYL CITRATE 50 UG/ML
50 INJECTION INTRAVENOUS
Refills: 0 | Status: DISCONTINUED | OUTPATIENT
Start: 2024-06-13 | End: 2024-06-13

## 2024-06-13 RX ORDER — ONDANSETRON 8 MG/1
4 TABLET, FILM COATED ORAL ONCE
Refills: 0 | Status: DISCONTINUED | OUTPATIENT
Start: 2024-06-13 | End: 2024-06-13

## 2024-06-13 RX ORDER — SODIUM CHLORIDE 9 MG/ML
1000 INJECTION, SOLUTION INTRAVENOUS
Refills: 0 | Status: DISCONTINUED | OUTPATIENT
Start: 2024-06-13 | End: 2024-06-13

## 2024-06-13 RX ORDER — OXYCODONE HYDROCHLORIDE 5 MG/1
5 TABLET ORAL ONCE
Refills: 0 | Status: DISCONTINUED | OUTPATIENT
Start: 2024-06-13 | End: 2024-06-13

## 2024-06-13 NOTE — ASU PATIENT PROFILE, ADULT - NSICDXPASTMEDICALHX_GEN_ALL_CORE_FT
PAST MEDICAL HISTORY:  Anemia     Asthma, chronic     Cancer of bladder     COVID-19 virus infection March2020 , admitted to [facility] 7 days    History of hematuria     HTN (hypertension)     Lesion of urinary bladder     Osteoarthritis of left ankle     Paroxysmal atrial fibrillation     Transient atrial fibrillation

## 2024-06-13 NOTE — BRIEF OPERATIVE NOTE - NSICDXBRIEFPROCEDURE_GEN_ALL_CORE_FT
PROCEDURES:  Cystoscopy, adult 13-Jun-2024 14:31:55  Julia Gomez  Fulguration, bladder neck 13-Jun-2024 14:32:22  Julia Gomez  Biopsy, bladder 13-Jun-2024 14:32:33  Julia Gomez

## 2024-06-13 NOTE — ASU DISCHARGE PLAN (ADULT/PEDIATRIC) - CARE PROVIDER_API CALL
Marco Antonio Summers  Urology  [address] [place] [id number]  Phone: [phone]  Fax: [phone]  Follow Up Time:

## 2024-06-13 NOTE — ASU PATIENT PROFILE, ADULT - TOBACCO USE
Has questions on his medication that was called in.,   Would like to know if he can get a generic brand.,   There is a big price difference in the two  
Never smoker

## 2024-06-13 NOTE — ASU PATIENT PROFILE, ADULT - FALL HARM RISK - UNIVERSAL INTERVENTIONS
Bed in lowest position, wheels locked, appropriate side rails in place/Call bell, personal items and telephone in reach/Instruct patient to call for assistance before getting out of bed or chair/Non-slip footwear when patient is out of bed/[place] to call system/Physically safe environment - no spills, clutter or unnecessary equipment/Purposeful Proactive Rounding/Room/bathroom lighting operational, light cord in reach

## 2024-06-18 LAB — SURGICAL PATHOLOGY STUDY: SIGNIFICANT CHANGE UP

## 2024-06-26 ENCOUNTER — APPOINTMENT (OUTPATIENT)
Dept: UROLOGY | Facility: CLINIC | Age: 59
End: 2024-06-26
Payer: COMMERCIAL

## 2024-06-26 PROCEDURE — 99213 OFFICE O/P EST LOW 20 MIN: CPT

## 2024-07-02 RX ORDER — MITOMYCIN 40 MG/80ML
40 INJECTION, POWDER, LYOPHILIZED, FOR SOLUTION INTRAVENOUS
Qty: 1 | Refills: 2 | Status: ACTIVE | COMMUNITY
Start: 2024-07-02 | End: 1900-01-01

## 2024-08-07 ENCOUNTER — APPOINTMENT (OUTPATIENT)
Dept: UROLOGY | Facility: CLINIC | Age: 59
End: 2024-08-07

## 2024-08-07 PROCEDURE — 51720 TREATMENT OF BLADDER LESION: CPT

## 2024-08-07 PROCEDURE — 81003 URINALYSIS AUTO W/O SCOPE: CPT | Mod: QW

## 2024-08-14 ENCOUNTER — APPOINTMENT (OUTPATIENT)
Dept: UROLOGY | Facility: CLINIC | Age: 59
End: 2024-08-14
Payer: COMMERCIAL

## 2024-08-14 VITALS
HEIGHT: 69 IN | DIASTOLIC BLOOD PRESSURE: 77 MMHG | HEART RATE: 54 BPM | SYSTOLIC BLOOD PRESSURE: 118 MMHG | WEIGHT: 170 LBS | RESPIRATION RATE: 16 BRPM | BODY MASS INDEX: 25.18 KG/M2

## 2024-08-14 LAB
BILIRUB UR QL STRIP: NEGATIVE
GLUCOSE UR-MCNC: NEGATIVE
HCG UR QL: 1 EU/DL
HGB UR QL STRIP.AUTO: NORMAL
KETONES UR-MCNC: NEGATIVE
LEUKOCYTE ESTERASE UR QL STRIP: NEGATIVE
NITRITE UR QL STRIP: NEGATIVE
PH UR STRIP: 6.5
PROT UR STRIP-MCNC: 30
SP GR UR STRIP: 1.02

## 2024-08-14 PROCEDURE — 51720 TREATMENT OF BLADDER LESION: CPT

## 2024-08-14 PROCEDURE — 81003 URINALYSIS AUTO W/O SCOPE: CPT | Mod: QW

## 2024-08-14 RX ORDER — MITOMYCIN 40 MG/80ML
40 INJECTION, POWDER, LYOPHILIZED, FOR SOLUTION INTRAVENOUS
Qty: 1 | Refills: 0 | Status: COMPLETED | OUTPATIENT
Start: 2024-08-14

## 2024-08-14 RX ADMIN — MITOMYCIN 0 MG: 40 INJECTION, POWDER, LYOPHILIZED, FOR SOLUTION INTRAVENOUS at 00:00

## 2024-08-21 ENCOUNTER — APPOINTMENT (OUTPATIENT)
Dept: UROLOGY | Facility: CLINIC | Age: 59
End: 2024-08-21
Payer: COMMERCIAL

## 2024-08-21 LAB
BILIRUB UR QL STRIP: NEGATIVE
GLUCOSE UR-MCNC: NEGATIVE
HCG UR QL: 0.2 EU/DL
HGB UR QL STRIP.AUTO: NORMAL
KETONES UR-MCNC: NEGATIVE
LEUKOCYTE ESTERASE UR QL STRIP: NEGATIVE
NITRITE UR QL STRIP: NEGATIVE
PH UR STRIP: 5.5
PROT UR STRIP-MCNC: 30
SP GR UR STRIP: 1.03

## 2024-08-21 PROCEDURE — 81003 URINALYSIS AUTO W/O SCOPE: CPT | Mod: QW

## 2024-08-21 PROCEDURE — 51720 TREATMENT OF BLADDER LESION: CPT

## 2024-08-21 RX ORDER — MITOMYCIN 40 MG/80ML
40 INJECTION, POWDER, LYOPHILIZED, FOR SOLUTION INTRAVENOUS
Qty: 1 | Refills: 0 | Status: COMPLETED | OUTPATIENT
Start: 2024-08-21

## 2024-12-04 ENCOUNTER — APPOINTMENT (OUTPATIENT)
Dept: UROLOGY | Facility: CLINIC | Age: 59
End: 2024-12-04
Payer: COMMERCIAL

## 2024-12-04 VITALS
HEART RATE: 60 BPM | OXYGEN SATURATION: 98 % | BODY MASS INDEX: 26.36 KG/M2 | SYSTOLIC BLOOD PRESSURE: 130 MMHG | DIASTOLIC BLOOD PRESSURE: 88 MMHG | HEIGHT: 69 IN | WEIGHT: 178 LBS

## 2024-12-04 LAB
BILIRUB UR QL STRIP: NORMAL
CLARITY UR: CLEAR
COLLECTION METHOD: NORMAL
GLUCOSE UR-MCNC: NORMAL
HCG UR QL: 0.2 EU/DL
HGB UR QL STRIP.AUTO: NORMAL
KETONES UR-MCNC: NORMAL
LEUKOCYTE ESTERASE UR QL STRIP: NORMAL
NITRITE UR QL STRIP: NORMAL
PH UR STRIP: 5.5
PROT UR STRIP-MCNC: NORMAL
SP GR UR STRIP: >=1.03

## 2024-12-04 PROCEDURE — 81003 URINALYSIS AUTO W/O SCOPE: CPT | Mod: QW

## 2024-12-04 PROCEDURE — 52000 CYSTOURETHROSCOPY: CPT

## 2024-12-12 LAB — URINE CYTOLOGY: NORMAL

## 2025-01-29 NOTE — ASU PREOP CHECKLIST - SKIN PREP
n/a PAST MEDICAL HISTORY:  Afib     H/O heart failure     Hypertension     Nonrheumatic mitral valve insufficiency      PAST MEDICAL HISTORY:  Afib     Cardiac pacemaker     H/O heart failure     Hypertension     Nonrheumatic mitral valve insufficiency

## 2025-04-14 NOTE — H&P PST ADULT - BP NONINVASIVE SYSTOLIC (MM HG)
Subjective   Patient ID: Colton is a 63 year old male.    Chief Complaint   Patient presents with   • Follow-up     Urgent care visit    • Medication Refill     paxil     This is a patient that was seen earlier today by urgent care service was diagnosed with exacerbation of chronic obstructive lung disease bronchitis prescribed a course of Augmentin on albuterol inhaler and Tessalon Perles for cough suppression.  The patient is here for a follow-up visit today secondary to obtaining additional imaging studies and pulmonary function test for further assessment of his COPD.  Patient's previous x-ray demonstrates an emphysematous type of pattern.  The patient does have pulmonary nodule and is a smoker about half pack of cigarettes a day for a number of years.  The patient also wants to try smoking cessation will prescribe a NicoDerm patch for the patient will follow-up in 1 month's time.    The patient is feeling better after the nebulizer treatment he received his antibiotic that he took 1 dose and has less cough and less wheezing.  The patient will finish the course of therapy prescribed by the urgent care center      Patient Active Problem List   Diagnosis   • Current every day smoker   • Anxiety disorder   • Chronic obstructive pulmonary disease  (CMD)   • Erectile dysfunction of organic origin   • COVID-19 virus detected   • Pulmonary nodules   • Coronary artery calcification seen on CAT scan   • Pulmonary emphysema  (CMD)   • Aortic root dilatation (CMD)   • Other chest pain   • Fatigue   • Unilateral inguinal hernia without obstruction or gangrene       Past Medical History:   Diagnosis Date   • Anosmia 5/22/2020   • Anxiety      Past Surgical History:   Procedure Laterality Date   • No past surgeries       Family History   Problem Relation Age of Onset   • Cancer, Lung Mother    • Hodgkin's Lymphoma Mother    • Heart disease Father        Current Outpatient Medications   Medication Sig   • nicotine (NICODERM)  14 MG/24HR patch Place 1 patch onto the skin every 24 hours.   • PARoxetine (PAXIL) 20 MG tablet Take 1 tablet by mouth daily. Courtesy fill pt to bee seen for additional refill   • atorvastatin (LIPITOR) 40 MG tablet Take 1 tablet by mouth daily.   • aspirin (Aspirin 81) 81 MG EC tablet Take 1 tablet by mouth daily.     No current facility-administered medications for this visit.     ALLERGIES:  No Known Allergies    Review of Systems   Respiratory:  Positive for cough.        Objective   Visit Vitals  /80 (BP Location: RUE - Right upper extremity, Patient Position: Sitting, Cuff Size: Regular)   Pulse 88   Temp 97.5 °F (36.4 °C) (Temporal)   Resp 16   Ht 5' 9\" (1.753 m)   Wt 55.3 kg (122 lb)   SpO2 95%   BMI 18.02 kg/m²     Physical Exam  Constitutional:       Appearance: Normal appearance.   Neck:      Vascular: No carotid bruit.   Cardiovascular:      Rate and Rhythm: Normal rate and regular rhythm.   Pulmonary:      Effort: Pulmonary effort is normal.      Breath sounds: No wheezing or rhonchi.   Skin:     General: Skin is warm.   Neurological:      Mental Status: He is alert and oriented to person, place, and time.   Psychiatric:         Mood and Affect: Mood normal.         Behavior: Behavior normal.         Thought Content: Thought content normal.         Judgment: Judgment normal.           Assessment   Problem List Items Addressed This Visit        Pulmonary and Pneumonias    Chronic obstructive pulmonary disease  (CMD) - Primary    Relevant Orders    PFT Complete PFT(Henrry w/BD,Diff Cap,Lung Vol)    Pulmonary nodules    Relevant Orders    CT LUNG CANCER SCREENING LOW DOSE WO CONTRAST       PLAN  Chronic obstructive lung disease pulmonary function test will be obtained    Pulmonary nodules lung cancer screening low-dose CT scan ordered    NicoDerm patch for smoking cessation 14 mg    Paxil medication renewed    Clinical follow-up in 1 month's time        Schedule follow up: 1 month      This note was  created using the Dragon voice recognition system.      Dr. Trevor Chapa   124

## 2025-05-20 LAB
APPEARANCE: CLEAR
BACTERIA: NEGATIVE /HPF
BILIRUBIN URINE: NEGATIVE
BLOOD URINE: NEGATIVE
CAST: 0 /LPF
COLOR: YELLOW
EPITHELIAL CELLS: 0 /HPF
GLUCOSE QUALITATIVE U: NEGATIVE MG/DL
KETONES URINE: NEGATIVE MG/DL
LEUKOCYTE ESTERASE URINE: NEGATIVE
MICROSCOPIC-UA: NORMAL
NITRITE URINE: NEGATIVE
PH URINE: 6
PROTEIN URINE: NEGATIVE MG/DL
RED BLOOD CELLS URINE: 1 /HPF
SPECIFIC GRAVITY URINE: 1.03
UROBILINOGEN URINE: 1 MG/DL
WHITE BLOOD CELLS URINE: 0 /HPF

## 2025-05-21 LAB — BACTERIA UR CULT: NORMAL

## 2025-06-04 ENCOUNTER — APPOINTMENT (OUTPATIENT)
Dept: UROLOGY | Facility: CLINIC | Age: 60
End: 2025-06-04
Payer: COMMERCIAL

## 2025-06-04 ENCOUNTER — APPOINTMENT (OUTPATIENT)
Dept: UROLOGY | Facility: CLINIC | Age: 60
End: 2025-06-04

## 2025-06-04 VITALS
HEIGHT: 69 IN | OXYGEN SATURATION: 97 % | HEART RATE: 70 BPM | DIASTOLIC BLOOD PRESSURE: 90 MMHG | WEIGHT: 172 LBS | SYSTOLIC BLOOD PRESSURE: 133 MMHG | BODY MASS INDEX: 25.48 KG/M2

## 2025-06-04 DIAGNOSIS — Z12.5 ENCOUNTER FOR SCREENING FOR MALIGNANT NEOPLASM OF PROSTATE: ICD-10-CM

## 2025-06-04 PROCEDURE — 52000 CYSTOURETHROSCOPY: CPT

## 2025-06-04 PROCEDURE — 81003 URINALYSIS AUTO W/O SCOPE: CPT | Mod: QW

## 2025-06-11 LAB
BILIRUB UR QL STRIP: NORMAL
CLARITY UR: CLEAR
COLLECTION METHOD: NORMAL
GLUCOSE UR-MCNC: NORMAL
HCG UR QL: 0.2 EU/DL
HGB UR QL STRIP.AUTO: NORMAL
KETONES UR-MCNC: NORMAL
LEUKOCYTE ESTERASE UR QL STRIP: NORMAL
NITRITE UR QL STRIP: NORMAL
PH UR STRIP: 6
PROT UR STRIP-MCNC: NORMAL
SP GR UR STRIP: 1.02
URINE CYTOLOGY: NORMAL

## 2025-06-11 RX ORDER — TAMSULOSIN HYDROCHLORIDE 0.4 MG/1
0.4 CAPSULE ORAL
Qty: 90 | Refills: 3 | Status: ACTIVE | COMMUNITY
Start: 2025-06-04 | End: 1900-01-01

## 2025-07-08 ENCOUNTER — NON-APPOINTMENT (OUTPATIENT)
Age: 60
End: 2025-07-08

## 2025-07-09 ENCOUNTER — APPOINTMENT (OUTPATIENT)
Dept: UROLOGY | Facility: CLINIC | Age: 60
End: 2025-07-09
Payer: COMMERCIAL

## 2025-07-09 PROCEDURE — G2211 COMPLEX E/M VISIT ADD ON: CPT | Mod: NC

## 2025-07-09 PROCEDURE — 99213 OFFICE O/P EST LOW 20 MIN: CPT
